# Patient Record
Sex: FEMALE | Race: WHITE | HISPANIC OR LATINO | Employment: OTHER | URBAN - METROPOLITAN AREA
[De-identification: names, ages, dates, MRNs, and addresses within clinical notes are randomized per-mention and may not be internally consistent; named-entity substitution may affect disease eponyms.]

---

## 2017-02-16 RX ORDER — SERTRALINE HYDROCHLORIDE 50 MG/1
50 TABLET, FILM COATED ORAL DAILY
Qty: 90 TABLET | Refills: 0 | Status: SHIPPED | OUTPATIENT
Start: 2017-02-16 | End: 2017-06-05 | Stop reason: SDUPTHER

## 2017-06-05 RX ORDER — SERTRALINE HYDROCHLORIDE 50 MG/1
50 TABLET, FILM COATED ORAL DAILY
Qty: 90 TABLET | Refills: 1 | Status: SHIPPED | OUTPATIENT
Start: 2017-06-05 | End: 2017-11-30 | Stop reason: SDUPTHER

## 2017-06-14 DIAGNOSIS — I10 ESSENTIAL HYPERTENSION: ICD-10-CM

## 2017-06-14 RX ORDER — LISINOPRIL 10 MG/1
10 TABLET ORAL DAILY
Qty: 30 TABLET | Refills: 4 | Status: SHIPPED | OUTPATIENT
Start: 2017-06-14 | End: 2017-08-16 | Stop reason: SDUPTHER

## 2017-08-16 DIAGNOSIS — I10 ESSENTIAL HYPERTENSION: ICD-10-CM

## 2017-08-16 RX ORDER — LISINOPRIL 10 MG/1
10 TABLET ORAL DAILY
Qty: 90 TABLET | Refills: 0 | Status: SHIPPED | OUTPATIENT
Start: 2017-08-16 | End: 2017-12-05 | Stop reason: SDUPTHER

## 2017-10-03 ENCOUNTER — IMMUNIZATION (OUTPATIENT)
Dept: INTERNAL MEDICINE | Facility: CLINIC | Age: 77
End: 2017-10-03
Payer: MEDICARE

## 2017-10-03 ENCOUNTER — OFFICE VISIT (OUTPATIENT)
Dept: INTERNAL MEDICINE | Facility: CLINIC | Age: 77
End: 2017-10-03
Payer: MEDICARE

## 2017-10-03 ENCOUNTER — HOSPITAL ENCOUNTER (OUTPATIENT)
Dept: RADIOLOGY | Facility: HOSPITAL | Age: 77
Discharge: HOME | End: 2017-10-03
Attending: NURSE PRACTITIONER
Payer: MEDICARE

## 2017-10-03 ENCOUNTER — TELEPHONE (OUTPATIENT)
Dept: INTERNAL MEDICINE | Facility: CLINIC | Age: 77
End: 2017-10-03

## 2017-10-03 VITALS
BODY MASS INDEX: 30.17 KG/M2 | HEART RATE: 97 BPM | SYSTOLIC BLOOD PRESSURE: 118 MMHG | OXYGEN SATURATION: 96 % | HEIGHT: 61 IN | WEIGHT: 159.81 LBS | DIASTOLIC BLOOD PRESSURE: 60 MMHG | TEMPERATURE: 98 F

## 2017-10-03 DIAGNOSIS — I10 ESSENTIAL HYPERTENSION: ICD-10-CM

## 2017-10-03 DIAGNOSIS — Z90.710 STATUS POST VAGINAL HYSTERECTOMY: ICD-10-CM

## 2017-10-03 DIAGNOSIS — Z00.00 ENCOUNTER FOR PREVENTIVE HEALTH EXAMINATION: Primary | ICD-10-CM

## 2017-10-03 DIAGNOSIS — K21.9 GASTROESOPHAGEAL REFLUX DISEASE WITHOUT ESOPHAGITIS: ICD-10-CM

## 2017-10-03 DIAGNOSIS — K44.9 HIATAL HERNIA: ICD-10-CM

## 2017-10-03 DIAGNOSIS — Z12.31 VISIT FOR SCREENING MAMMOGRAM: ICD-10-CM

## 2017-10-03 DIAGNOSIS — I77.1 TORTUOUS AORTA: ICD-10-CM

## 2017-10-03 DIAGNOSIS — M81.0 OSTEOPOROSIS, SENILE: ICD-10-CM

## 2017-10-03 PROCEDURE — G0439 PPPS, SUBSEQ VISIT: HCPCS | Mod: S$GLB,,, | Performed by: NURSE PRACTITIONER

## 2017-10-03 PROCEDURE — 77067 SCR MAMMO BI INCL CAD: CPT | Mod: TC

## 2017-10-03 PROCEDURE — 90662 IIV NO PRSV INCREASED AG IM: CPT | Mod: S$GLB,,, | Performed by: NURSE PRACTITIONER

## 2017-10-03 PROCEDURE — G0008 ADMIN INFLUENZA VIRUS VAC: HCPCS | Mod: S$GLB,,, | Performed by: NURSE PRACTITIONER

## 2017-10-03 PROCEDURE — 99999 PR PBB SHADOW E&M-EST. PATIENT-LVL V: CPT | Mod: PBBFAC,,, | Performed by: NURSE PRACTITIONER

## 2017-10-03 PROCEDURE — 77067 SCR MAMMO BI INCL CAD: CPT | Mod: 26,,, | Performed by: RADIOLOGY

## 2017-10-03 PROCEDURE — 99499 UNLISTED E&M SERVICE: CPT | Mod: S$GLB,,, | Performed by: NURSE PRACTITIONER

## 2017-10-03 RX ORDER — UBIDECARENONE 30 MG
30 CAPSULE ORAL DAILY
COMMUNITY
End: 2019-02-19

## 2017-10-03 NOTE — PATIENT INSTRUCTIONS
Counseling and Referral of Other Preventative  (Italic type indicates deductible and co-insurance are waived)    Patient Name: Milady Blanco  Today's Date: 10/3/2017      SERVICE LIMITATIONS RECOMMENDATION    Vaccines    · Pneumococcal (once after 65)    · Influenza (annually)    · Hepatitis B (if medium/high risk)    · Prevnar 13      Hepatitis B medium/high risk factors:       - End-stage renal disease       - Hemophiliacs who received Factor VII or         IX concentrates       - Clients of institutions for the mentally             retarded       - Persons who live in the same house as          a HepB carrier       - Homosexual men       - Illicit injectable drug abusers     Pneumococcal: Done, no repeat necessary     Influenza: Scheduled - see appointments     Hepatitis B: N/A     Prevnar 13: Done, no repeat necessary    Mammogram (biennial age 50-74)  Annually (age 40 or over)  Scheduled, see appointments    Pap (up to age 70 and after 70 if unknown history or abnormal study last 10 years)    N/A     The USPSTF recommends against screening for cervical cancer in women older than age 65 years who have had adequate prior screening and are not otherwise at high risk for cervical cancer.      Colorectal cancer screening (to age 75)    · Fecal occult blood test (annual)  · Flexible sigmoidoscopy (5y)  · Screening colonoscopy (10y)  · Barium enema   Last done 2016, recommend to repeat every 3  years    Diabetes self-management training (no USPSTF recommendations)  Requires referral by treating physician for patient with diabetes or renal disease. 10 hours of initial DSMT sessions of no less than 30 minutes each in a continuous 12-month period. 2 hours of follow-up DSMT in subsequent years.  N/A    Bone mass measurements (age 65 & older, biennial)  Requires diagnosis related to osteoporosis or estrogen deficiency. Biennial benefit unless patient has history of long-term glucocorticoid  Scheduled, see  appointments    Glaucoma screening (no USPSTF recommendation)  Diabetes mellitus, family history   , age 50 or over    American, age 65 or over  Recommend follow up with eye care professional regularly    Medical nutrition therapy for diabetes or renal disease (no recommended schedule)  Requires referral by treating physician for patient with diabetes or renal disease or kidney transplant within the past 3 years.  Can be provided in same year as diabetes self-management training (DSMT), and CMS recommends medical nutrition therapy take place after DSMT. Up to 3 hours for initial year and 2 hours in subsequent years.  N/A    Cardiovascular screening blood tests (every 5 years)  · Fasting lipid panel  Order as a panel if possible  Done this year, repeat every year    Diabetes screening tests (at least every 3 years, Medicare covers annually or at 6-month intervals for prediabetic patients)  · Fasting blood sugar (FBS) or glucose tolerance test (GTT)  Patient must be diagnosed with one of the following:       - Hypertension       - Dyslipidemia       - Obesity (BMI 30kg/m2)       - Previous elevated impaired FBS or GTT       ... or any two of the following:       - Overweight (BMI 25 but <30)       - Family history of diabetes       - Age 65 or older       - History of gestational diabetes or birth of baby weighing more than 9 pounds  Done this year, repeat every year    HIV screening (annually for increased risk patients)  · HIV-1 and HIV-2 by EIA, or RAMIRO, rapid antibody test or oral mucosa transudate  Patients must be at increased risk for HIV infection per USPSTF guidelines or pregnant. Tests covered annually for patient at increased risk or as requested by the patient. Pregnant patients may receive up to 3 tests during pregnancy.  Risks discussed, screening is not recommended    Smoking cessation counseling (up to 8 sessions per year)  Patients must be asymptomatic of tobacco-related  conditions to receive as a preventative service.  Non-smoker    Subsequent annual wellness visit  At least 12 months since last AWV  Return in one year     The following information is provided to all patients.  This information is to help you find resources for any of the problems found today that may be affecting your health:                Living healthy guide: www.[facility].[place].[facility]      Understanding Diabetes: www.diabetes.org      Eating healthy: www.cdc.gov/healthyweight      CDC home safety checklist: www.cdc.gov/steadi/patient.html      Agency on Aging: www.goea.[place].[facility]      Alcoholics anonymous (AA): www.aa.org      Physical Activity: www.trey.nih.gov/kp6mmhs      Tobacco use: www.quitwithusla.org

## 2017-10-03 NOTE — PROGRESS NOTES
"Milady Blanco presented for a  Medicare AWV and comprehensive Health Risk Assessment today. The following components were reviewed and updated:    · Medical history  · Family History  · Social history  · Allergies and Current Medications  · Health Risk Assessment  · Health Maintenance  · Care Team     ** See Completed Assessments for Annual Wellness Visit within the encounter summary.**       The following assessments were completed:  · Living Situation  · CAGE  · Depression Screening  · Timed Get Up and Go  · Whisper Test  · Cognitive Function Screening  · Nutrition Screening  · ADL Screening  · PAQ Screening    Vitals:    10/03/17 1505 10/03/17 1551   BP: 118/60    Pulse: (!) 111 97   Temp: 98.1 °F (36.7 °C)    TempSrc: Oral    SpO2: 96%    Weight: 72.5 kg (159 lb 13.3 oz)    Height: 5' 1" (1.549 m)      Body mass index is 30.2 kg/m².  Physical Exam   Constitutional: She is oriented to person, place, and time. She appears well-developed.   overweight   HENT:   Head: Normocephalic and atraumatic.   Nose: Nose normal.   Eyes: Conjunctivae and EOM are normal.   Neck: Normal range of motion. Neck supple.   Cardiovascular: Normal rate, regular rhythm, normal heart sounds and intact distal pulses.    No murmur heard.  Pulmonary/Chest: Effort normal and breath sounds normal.   Musculoskeletal: Normal range of motion.   Neurological: She is alert and oriented to person, place, and time.   Skin: Skin is warm and dry.   Psychiatric: She has a normal mood and affect. Her behavior is normal. Judgment and thought content normal.   Nursing note and vitals reviewed.        Diagnoses and health risks identified today and associated recommendations/orders:    1. Encounter for preventive health examination  Assessment performed. Health maintenance updated. Chart review completed.  -Declined .  is present for translation.    2. Visit for screening mammogram  - Mammo Digital Screening Bilateral With CAD; " Future    3. Osteoporosis, senile  - DXA Bone Density Spine And Hip; Future    4. Essential hypertension  Chronic. Stable on current regimen. Followed by PCP.    5. Gastroesophageal reflux disease without esophagitis  Chronic. Stable on current regimen. Followed by Gastroenterology.    6. Hiatal hernia  Stable. Followed by PCP.    7. Status post vaginal hysterectomy  Stable. Followed by OB and Gyn.    8. Tortuous aorta  Noted on imaging 5/27/2014. Stable with blood pressure control. Followed by PCp.    9. BMI 30.0-30.9,adult  Stable. Followed by PCP.      Provided Milady with a 5-10 year written screening schedule and personal prevention plan. Recommendations were developed using the USPSTF age appropriate recommendations. Education, counseling, and referrals were provided as needed. After Visit Summary printed and given to patient which includes a list of additional screenings\tests needed.    Return for follow up with Primary Care Provider as instructed, ;sooner if problems, HRA in 1 year.    URIAH Celaya

## 2017-10-10 ENCOUNTER — HOSPITAL ENCOUNTER (OUTPATIENT)
Dept: RADIOLOGY | Facility: CLINIC | Age: 77
Discharge: HOME | End: 2017-10-10
Attending: NURSE PRACTITIONER
Payer: MEDICARE

## 2017-10-10 DIAGNOSIS — M81.0 OSTEOPOROSIS, SENILE: ICD-10-CM

## 2017-10-10 PROCEDURE — 77080 DXA BONE DENSITY AXIAL: CPT | Mod: TC

## 2017-10-10 PROCEDURE — 77080 DXA BONE DENSITY AXIAL: CPT | Mod: 26,,, | Performed by: INTERNAL MEDICINE

## 2017-10-27 ENCOUNTER — TELEPHONE (OUTPATIENT)
Dept: INTERNAL MEDICINE | Facility: CLINIC | Age: 77
End: 2017-10-27

## 2017-11-30 RX ORDER — SERTRALINE HYDROCHLORIDE 50 MG/1
50 TABLET, FILM COATED ORAL DAILY
Qty: 90 TABLET | Refills: 1 | Status: SHIPPED | OUTPATIENT
Start: 2017-11-30 | End: 2018-05-27 | Stop reason: SDUPTHER

## 2017-12-05 DIAGNOSIS — I10 ESSENTIAL HYPERTENSION: ICD-10-CM

## 2017-12-05 RX ORDER — LISINOPRIL 10 MG/1
10 TABLET ORAL DAILY
Qty: 90 TABLET | Refills: 0 | Status: SHIPPED | OUTPATIENT
Start: 2017-12-05 | End: 2018-03-03 | Stop reason: SDUPTHER

## 2018-03-03 DIAGNOSIS — I10 ESSENTIAL HYPERTENSION: ICD-10-CM

## 2018-03-03 RX ORDER — LISINOPRIL 10 MG/1
10 TABLET ORAL DAILY
Qty: 90 TABLET | Refills: 0 | Status: SHIPPED | OUTPATIENT
Start: 2018-03-03 | End: 2018-05-30 | Stop reason: SDUPTHER

## 2018-04-04 ENCOUNTER — HOSPITAL ENCOUNTER (OUTPATIENT)
Dept: RADIOLOGY | Facility: HOSPITAL | Age: 78
Discharge: HOME | End: 2018-04-04
Attending: NURSE PRACTITIONER
Payer: MEDICARE

## 2018-04-04 ENCOUNTER — OFFICE VISIT (OUTPATIENT)
Dept: INTERNAL MEDICINE | Facility: CLINIC | Age: 78
End: 2018-04-04
Payer: MEDICARE

## 2018-04-04 VITALS
HEIGHT: 61 IN | DIASTOLIC BLOOD PRESSURE: 70 MMHG | HEART RATE: 94 BPM | WEIGHT: 162.69 LBS | BODY MASS INDEX: 30.71 KG/M2 | OXYGEN SATURATION: 97 % | SYSTOLIC BLOOD PRESSURE: 122 MMHG

## 2018-04-04 DIAGNOSIS — S60.222A CONTUSION OF LEFT HAND, INITIAL ENCOUNTER: Primary | ICD-10-CM

## 2018-04-04 DIAGNOSIS — S60.222A CONTUSION OF LEFT HAND, INITIAL ENCOUNTER: ICD-10-CM

## 2018-04-04 PROCEDURE — 99999 PR PBB SHADOW E&M-EST. PATIENT-LVL IV: CPT | Mod: PBBFAC,,, | Performed by: NURSE PRACTITIONER

## 2018-04-04 PROCEDURE — 3078F DIAST BP <80 MM HG: CPT | Mod: CPTII,S$GLB,, | Performed by: NURSE PRACTITIONER

## 2018-04-04 PROCEDURE — 3074F SYST BP LT 130 MM HG: CPT | Mod: CPTII,S$GLB,, | Performed by: NURSE PRACTITIONER

## 2018-04-04 PROCEDURE — 99214 OFFICE O/P EST MOD 30 MIN: CPT | Mod: S$GLB,,, | Performed by: NURSE PRACTITIONER

## 2018-04-04 PROCEDURE — 73130 X-RAY EXAM OF HAND: CPT | Mod: TC,LT

## 2018-04-04 PROCEDURE — 73130 X-RAY EXAM OF HAND: CPT | Mod: 26,LT,, | Performed by: RADIOLOGY

## 2018-04-04 NOTE — PROGRESS NOTES
Subjective:       Patient ID: Milady Blanco is a 77 y.o. female.    Chief Complaint: Rib Injury and Breast Pain    Pt here states that she tripped at home and fell forward landed on chest and hands.  2d after incident had chest pain and some SOB.  Those sx have resolved.  Only complaint today is left hand pain.  Pt is RHD.  Pt denies any head pain, neck pain, SOB, n/v/d/wrist pain.  Pt requesting interpretor  Interpretor not arranged by  at time appt was made ( 3/28), delay in seeing pt due to securing an interpretor          Past Medical History:   Diagnosis Date    Asymptomatic cholelithiasis     Colon polyp     Depression     Eosinophilic granuloma of lung     Gastritis     GERD (gastroesophageal reflux disease)     hiatal hernia     History of epistaxis     Hypertension     Osteoporosis, senile     Pelvic relaxation     with pessary      Past Surgical History:   Procedure Laterality Date    CARPAL TUNNEL RELEASE      Left    COLONOSCOPY N/A 10/20/2016    Procedure: COLONOSCOPY;  Surgeon: Arnel Bernard MD;  Location: [facility] ([address] [place] [address]);  Service: Endoscopy;  Laterality: N/A;    Excision of neurofibroma      EYE SURGERY      Eyelid surgery      HYSTERECTOMY       Social History     Social History Narrative    No narrative on file     Family History   Problem Relation Age of Onset    Coronary artery disease Mother     Heart disease Mother     No Known Problems Father     Coronary artery disease Maternal Grandmother     Heart disease Maternal Grandmother     Hypertension Paternal Uncle     Breast cancer Maternal Aunt     Cancer Maternal Aunt      breast cancer     Pancreatic cancer Cousin     Cancer Cousin      pancreatic cancer    Breast cancer Cousin     Heart disease Maternal Uncle     Breast cancer Cousin     No Known Problems Sister     No Known Problems Brother     No Known Problems Paternal Aunt     No Known Problems Maternal Grandfather     No Known  "Problems Paternal Grandmother     No Known Problems Paternal Grandfather     No Known Problems Brother     Multiple sclerosis Daughter     Colon cancer Neg Hx     Ovarian cancer Neg Hx     Amblyopia Neg Hx     Blindness Neg Hx     Cataracts Neg Hx     Diabetes Neg Hx     Glaucoma Neg Hx     Macular degeneration Neg Hx     Retinal detachment Neg Hx     Strabismus Neg Hx     Stroke Neg Hx     Thyroid disease Neg Hx      Vitals:    04/04/18 0857   BP: 122/70   Pulse: 94   SpO2: 97%   Weight: 73.8 kg (162 lb 11.2 oz)   Height: 5' 1" (1.549 m)     Outpatient Encounter Prescriptions as of 4/4/2018   Medication Sig Dispense Refill    calcium carbonate (OS-DAVID) 600 mg (1,500 mg) Tab Take 600 mg by mouth 2 (two) times daily with meals.      cholecalciferol, vitamin D3, 1,000 unit capsule Take 1 capsule (1,000 Units total) by mouth once daily.  0    co-enzyme Q-10 30 mg capsule Take 30 mg by mouth once daily.      lisinopril 10 MG tablet TAKE 1 TABLET (10 MG TOTAL) BY MOUTH ONCE DAILY. 90 tablet 0    milk thistle 175 mg tablet Take 175 mg by mouth once daily.      omeprazole (PRILOSEC) 40 MG capsule Take 1 capsule (40 mg total) by mouth once daily. 30 capsule 11    sertraline (ZOLOFT) 50 MG tablet TAKE 1 TABLET (50 MG TOTAL) BY MOUTH ONCE DAILY. 90 tablet 1    vitamin E 100 UNIT capsule Take 100 Units by mouth once daily.       No facility-administered encounter medications on file as of 4/4/2018.      Wt Readings from Last 3 Encounters:   04/04/18 73.8 kg (162 lb 11.2 oz)   10/03/17 72.5 kg (159 lb 13.3 oz)   10/20/16 74.8 kg (165 lb)     Last 3 sets of Vitals    Vitals - 1 value per visit 10/20/2016 10/3/2017 4/4/2018   SYSTOLIC 107 118 122   DIASTOLIC 60 60 70   PULSE 69 97 94   TEMPERATURE 97.4 98.1 -   RESPIRATIONS 18 - -   SPO2 93 96 97   Weight (lb) 165 159.83 162.7   Weight (kg) 74.844 72.5 73.8   HEIGHT 5' 1" 5' 1" 5' 1"   BODY MASS INDEX 31.18 30.2 30.74   VISIT REPORT - - -   Pain Score  - 0 " -     No results found for: CBC  Review of Systems   Constitutional: Negative for activity change, appetite change and fatigue.   HENT: Negative for congestion, tinnitus and trouble swallowing.    Respiratory: Negative for cough and shortness of breath.    Cardiovascular: Negative for chest pain and palpitations.   Gastrointestinal: Negative for abdominal pain, diarrhea, nausea and vomiting.   Genitourinary: Negative for difficulty urinating.   Musculoskeletal: Positive for arthralgias. Negative for joint swelling, myalgias, neck pain and neck stiffness.   Skin: Negative for wound.   Neurological: Negative for dizziness, light-headedness and headaches.   Psychiatric/Behavioral: Negative for sleep disturbance.       Objective:      Physical Exam   Constitutional: She is oriented to person, place, and time. She appears well-developed and well-nourished. No distress.   HENT:   Head: Normocephalic and atraumatic.   Right Ear: External ear normal.   Left Ear: External ear normal.   Nose: Nose normal.   Mouth/Throat: Oropharynx is clear and moist. No oropharyngeal exudate.   Eyes: Conjunctivae are normal. Pupils are equal, round, and reactive to light. Right eye exhibits no discharge. Left eye exhibits no discharge. No scleral icterus.   Neck: Normal range of motion. Neck supple.   Cardiovascular: Normal rate, regular rhythm, normal heart sounds and intact distal pulses.  Exam reveals no friction rub.    No murmur heard.  Pulmonary/Chest: Effort normal and breath sounds normal. No stridor. No respiratory distress. She has no wheezes. She has no rales. She exhibits no tenderness.   Abdominal: Soft. She exhibits no distension. There is no tenderness.   Musculoskeletal:        Left hand: She exhibits tenderness, bony tenderness and swelling. She exhibits normal range of motion, normal capillary refill, no deformity and no laceration. Normal sensation noted. Normal strength noted.        Hands:  Lymphadenopathy:     She has  no cervical adenopathy.   Neurological: She is alert and oriented to person, place, and time. No cranial nerve deficit.   Skin: Skin is warm and dry. Capillary refill takes less than 2 seconds. No rash noted. She is not diaphoretic. No erythema. No pallor.   Psychiatric: She has a normal mood and affect. Her behavior is normal. Judgment and thought content normal.   Nursing note and vitals reviewed.      Assessment:       1. Contusion of left hand, initial encounter        Plan:       Vy Clark interpretor   Will call pt with xray results  Milady was seen today for rib injury and breast pain.    Diagnoses and all orders for this visit:    Contusion of left hand, initial encounter  -     X-Ray Hand 3 view Left; Future      Patient Instructions       Tratamiento de un hematoma óseo (contusión ósea)  Un hematoma óseo es reymundo lesión en un hueso menos grave que reymundo fractura. Los hematomas óseos son bastante comunes. Pueden sucederles a personas de cualquier edad. Cualquier tipo de hueso del cuerpo puede tener un hematoma óseo. Junto con un hematoma óseo, suelen ocurrir otras lesiones; por ejemplo, daño en los ligamentos cercanos.  Tipos de tratamiento  El tratamiento de un hematoma óseo puede incluir lo siguiente:  · No utilizar el hueso o la articulación afectados  · Aplicarse reymundo compresa de hielo sobre la michael varias veces al día  · Subir la michael lesionada por encima del nivel de milian corazón para reducir la inflamación  · Binh medicamentos para reducir el dolor y la hinchazón  · Usar algún tipo de dispositivo que proteja la michael y limite el movimiento, en gary necesario  Milian médico puede darle recomendaciones sobre milian dieta. [place] es porque comer reymundo dieta lucas en calcio, vitamina D y proteínas puede ayudar en milian recuperación. Milian médico puede pedirle que no use ciertos medicamentos de venta clotilde para aliviar el dolor. Algunos de estos medicamentos retrasan la recuperación normal de los huesos. Si fuma, es posible  que camejo médico le sugiera dejar de hacerlo. Fumar puede retrasar la recuperación de los huesos.  Camejo proveedor de atención médica le dirá por cuánto tiempo no podrá poner peso sobre el hueso afectado. La mayoría de los hematomas óseos sanan lentamente en unos dos a cuatro meses. Un hematoma óseo más jose c puede tardar más en sanar. Quizás no pueda volver a trino actividades deportivas por algunas semanas o meses. Si trino síntomas no desaparecen, camejo proveedor de atención médica puede indicarle que se sabas reymundo resonancia magnética.  Posibles complicaciones de un hematoma óseo  La mayoría de los hematomas óseos sanan sin problemas. Si camejo hematoma óseo es muy jose c, puede que camejo cuerpo tenga dificultades para que la deo vuelva a fluir normalmente en la michael. Eso puede ocasionar reymundo necrosis avascular del hueso, lo que provoca la muerte de helen parte del hueso.     Cuándo llamar a camejo proveedor de atención médica  Llame a camejo proveedor de atención médica si trino síntomas no mejoran en los siguientes jacklyn. Llámelo de inmediato si tiene síntomas graves, philipp fiebre monika.                  Date Last Reviewed: 7/21/2015  © 2775-0087 [employer]. [address] [place] [address], [place] [address]. All rights reserved. This information is not intended as a substitute for professional medical care. Always follow your healthcare professional's instructions.

## 2018-04-04 NOTE — PATIENT INSTRUCTIONS
Tratamiento de un hematoma óseo (contusión ósea)  Un hematoma óseo es reymundo lesión en un hueso menos grave que reymundo fractura. Los hematomas óseos son bastante comunes. Pueden sucederles a personas de cualquier edad. Cualquier tipo de hueso del cuerpo puede tener un hematoma óseo. Junto con un hematoma óseo, suelen ocurrir otras lesiones; por ejemplo, daño en los ligamentos cercanos.  Tipos de tratamiento  El tratamiento de un hematoma óseo puede incluir lo siguiente:  · No utilizar el hueso o la articulación afectados  · Aplicarse reymundo compresa de hielo sobre la michael varias veces al día  · Subir la michael lesionada por encima del nivel de camejo corazón para reducir la inflamación  · Binh medicamentos para reducir el dolor y la hinchazón  · Usar algún tipo de dispositivo que proteja la michael y limite el movimiento, en gary necesario  Camejo médico puede darle recomendaciones sobre camejo dieta. [place] es porque comer reymundo dieta lucas en calcio, vitamina D y proteínas puede ayudar en camejo recuperación. Camejo médico puede pedirle que no use ciertos medicamentos de venta clotilde para aliviar el dolor. Algunos de estos medicamentos retrasan la recuperación normal de los huesos. Si fuma, es posible que camejo médico le sugiera dejar de hacerlo. Fumar puede retrasar la recuperación de los huesos.  Camejo proveedor de atención médica le dirá por cuánto tiempo no podrá poner peso sobre el hueso afectado. La mayoría de los hematomas óseos sanan lentamente en unos dos a cuatro meses. Un hematoma óseo más jose c puede tardar más en sanar. Quizás no pueda volver a trino actividades deportivas por algunas semanas o meses. Si trino síntomas no desaparecen, camejo proveedor de atención médica puede indicarle que se sabas reymundo resonancia magnética.  Posibles complicaciones de un hematoma óseo  La mayoría de los hematomas óseos sanan sin problemas. Si camejo hematoma óseo es muy jose c, puede que camejo cuerpo tenga dificultades para que la deo vuelva a fluir normalmente en la  michael. Eso puede ocasionar reymundo necrosis avascular del hueso, lo que provoca la muerte de helen parte del hueso.     Cuándo llamar a camejo proveedor de atención médica  Llame a camejo proveedor de atención médica si trino síntomas no mejoran en los siguientes jacklyn. Llámelo de inmediato si tiene síntomas graves, philipp fiebre monika.                  Date Last Reviewed: 7/21/2015  © 2683-8855 [employer]. [address] [place] [address], [place] [address]. All rights reserved. This information is not intended as a substitute for professional medical care. Always follow your healthcare professional's instructions.

## 2018-05-27 RX ORDER — SERTRALINE HYDROCHLORIDE 50 MG/1
50 TABLET, FILM COATED ORAL DAILY
Qty: 90 TABLET | Refills: 1 | Status: SHIPPED | OUTPATIENT
Start: 2018-05-27 | End: 2018-11-22 | Stop reason: SDUPTHER

## 2018-05-30 ENCOUNTER — PES CALL (OUTPATIENT)
Dept: ADMINISTRATIVE | Facility: CLINIC | Age: 78
End: 2018-05-30

## 2018-05-30 DIAGNOSIS — I10 ESSENTIAL HYPERTENSION: ICD-10-CM

## 2018-05-30 RX ORDER — LISINOPRIL 10 MG/1
10 TABLET ORAL DAILY
Qty: 90 TABLET | Refills: 0 | Status: SHIPPED | OUTPATIENT
Start: 2018-05-30 | End: 2018-09-27 | Stop reason: SDUPTHER

## 2018-09-27 DIAGNOSIS — I10 ESSENTIAL HYPERTENSION: ICD-10-CM

## 2018-09-27 RX ORDER — LISINOPRIL 10 MG/1
TABLET ORAL
Qty: 90 TABLET | Refills: 0 | Status: SHIPPED | OUTPATIENT
Start: 2018-09-27 | End: 2018-12-24 | Stop reason: SDUPTHER

## 2018-10-17 ENCOUNTER — TELEPHONE (OUTPATIENT)
Dept: INTERNAL MEDICINE | Facility: CLINIC | Age: 78
End: 2018-10-17

## 2018-10-17 NOTE — TELEPHONE ENCOUNTER
----- Message from Breezy Matias sent at 10/17/2018 10:27 AM CDT -----  Contact: Patient   1/08/19 Annual Physical need lab orders placed and linked  Thank you

## 2018-10-18 DIAGNOSIS — Z00.00 ANNUAL PHYSICAL EXAM: ICD-10-CM

## 2018-10-18 DIAGNOSIS — I10 ESSENTIAL HYPERTENSION: Primary | ICD-10-CM

## 2018-11-22 RX ORDER — SERTRALINE HYDROCHLORIDE 50 MG/1
TABLET, FILM COATED ORAL
Qty: 90 TABLET | Refills: 1 | Status: SHIPPED | OUTPATIENT
Start: 2018-11-22 | End: 2019-02-19

## 2018-12-24 DIAGNOSIS — I10 ESSENTIAL HYPERTENSION: ICD-10-CM

## 2018-12-24 RX ORDER — LISINOPRIL 10 MG/1
TABLET ORAL
Qty: 90 TABLET | Refills: 0 | Status: SHIPPED | OUTPATIENT
Start: 2018-12-24 | End: 2019-02-19 | Stop reason: SDUPTHER

## 2019-02-05 ENCOUNTER — LAB VISIT (OUTPATIENT)
Dept: LAB | Facility: HOSPITAL | Age: 79
End: 2019-02-05
Attending: INTERNAL MEDICINE
Payer: MEDICARE

## 2019-02-05 DIAGNOSIS — I10 ESSENTIAL HYPERTENSION: ICD-10-CM

## 2019-02-05 DIAGNOSIS — Z00.00 ANNUAL PHYSICAL EXAM: ICD-10-CM

## 2019-02-05 LAB
ALBUMIN SERPL BCP-MCNC: 3.7 G/DL
ALP SERPL-CCNC: 67 U/L
ALT SERPL W/O P-5'-P-CCNC: 14 U/L
ANION GAP SERPL CALC-SCNC: 7 MMOL/L
AST SERPL-CCNC: 14 U/L
BASOPHILS # BLD AUTO: 0.05 K/UL
BASOPHILS NFR BLD: 1.2 %
BILIRUB DIRECT SERPL-MCNC: 0.2 MG/DL
BILIRUB SERPL-MCNC: 0.5 MG/DL
BUN SERPL-MCNC: 22 MG/DL
CALCIUM SERPL-MCNC: 9.7 MG/DL
CHLORIDE SERPL-SCNC: 106 MMOL/L
CHOLEST SERPL-MCNC: 217 MG/DL
CHOLEST/HDLC SERPL: 3.2 {RATIO}
CO2 SERPL-SCNC: 27 MMOL/L
CREAT SERPL-MCNC: 0.8 MG/DL
DIFFERENTIAL METHOD: ABNORMAL
EOSINOPHIL # BLD AUTO: 0.1 K/UL
EOSINOPHIL NFR BLD: 2 %
ERYTHROCYTE [DISTWIDTH] IN BLOOD BY AUTOMATED COUNT: 13.6 %
EST. GFR  (AFRICAN AMERICAN): >60 ML/MIN/1.73 M^2
EST. GFR  (NON AFRICAN AMERICAN): >60 ML/MIN/1.73 M^2
GLUCOSE SERPL-MCNC: 91 MG/DL
HCT VFR BLD AUTO: 39.9 %
HDLC SERPL-MCNC: 67 MG/DL
HDLC SERPL: 30.9 %
HGB BLD-MCNC: 12.6 G/DL
LDLC SERPL CALC-MCNC: 110.2 MG/DL
LYMPHOCYTES # BLD AUTO: 1.7 K/UL
LYMPHOCYTES NFR BLD: 42 %
MCH RBC QN AUTO: 28.6 PG
MCHC RBC AUTO-ENTMCNC: 31.6 G/DL
MCV RBC AUTO: 91 FL
MONOCYTES # BLD AUTO: 0.5 K/UL
MONOCYTES NFR BLD: 12 %
NEUTROPHILS # BLD AUTO: 1.7 K/UL
NEUTROPHILS NFR BLD: 42.6 %
NONHDLC SERPL-MCNC: 150 MG/DL
PLATELET # BLD AUTO: 165 K/UL
PMV BLD AUTO: 10.9 FL
POTASSIUM SERPL-SCNC: 4.4 MMOL/L
PROT SERPL-MCNC: 6.6 G/DL
RBC # BLD AUTO: 4.4 M/UL
SODIUM SERPL-SCNC: 140 MMOL/L
TRIGL SERPL-MCNC: 199 MG/DL
TSH SERPL DL<=0.005 MIU/L-ACNC: 2.27 UIU/ML
WBC # BLD AUTO: 4.07 K/UL

## 2019-02-05 PROCEDURE — 85025 COMPLETE CBC W/AUTO DIFF WBC: CPT | Mod: HCNC

## 2019-02-05 PROCEDURE — 36415 COLL VENOUS BLD VENIPUNCTURE: CPT | Mod: HCNC

## 2019-02-05 PROCEDURE — 80076 HEPATIC FUNCTION PANEL: CPT | Mod: HCNC

## 2019-02-05 PROCEDURE — 80048 BASIC METABOLIC PNL TOTAL CA: CPT | Mod: HCNC

## 2019-02-05 PROCEDURE — 80061 LIPID PANEL: CPT | Mod: HCNC

## 2019-02-05 PROCEDURE — 84443 ASSAY THYROID STIM HORMONE: CPT | Mod: HCNC

## 2019-02-19 ENCOUNTER — HOSPITAL ENCOUNTER (OUTPATIENT)
Dept: CARDIOLOGY | Facility: CLINIC | Age: 79
Discharge: HOME | End: 2019-02-19
Payer: MEDICARE

## 2019-02-19 ENCOUNTER — OFFICE VISIT (OUTPATIENT)
Dept: INTERNAL MEDICINE | Facility: CLINIC | Age: 79
End: 2019-02-19
Payer: MEDICARE

## 2019-02-19 ENCOUNTER — HOSPITAL ENCOUNTER (OUTPATIENT)
Dept: RADIOLOGY | Facility: HOSPITAL | Age: 79
Discharge: HOME | End: 2019-02-19
Attending: INTERNAL MEDICINE
Payer: MEDICARE

## 2019-02-19 VITALS
SYSTOLIC BLOOD PRESSURE: 118 MMHG | WEIGHT: 164.69 LBS | BODY MASS INDEX: 31.09 KG/M2 | HEIGHT: 61 IN | HEART RATE: 68 BPM | DIASTOLIC BLOOD PRESSURE: 78 MMHG

## 2019-02-19 DIAGNOSIS — I10 HYPERTENSION, UNSPECIFIED TYPE: ICD-10-CM

## 2019-02-19 DIAGNOSIS — K44.9 HIATAL HERNIA: ICD-10-CM

## 2019-02-19 DIAGNOSIS — F41.9 ANXIETY: ICD-10-CM

## 2019-02-19 DIAGNOSIS — Z00.00 ANNUAL PHYSICAL EXAM: Primary | ICD-10-CM

## 2019-02-19 DIAGNOSIS — I10 ESSENTIAL HYPERTENSION: ICD-10-CM

## 2019-02-19 DIAGNOSIS — Z12.31 ENCOUNTER FOR SCREENING MAMMOGRAM FOR BREAST CANCER: ICD-10-CM

## 2019-02-19 PROCEDURE — 99397 PER PM REEVAL EST PAT 65+ YR: CPT | Mod: HCNC,S$GLB,, | Performed by: INTERNAL MEDICINE

## 2019-02-19 PROCEDURE — 3074F PR MOST RECENT SYSTOLIC BLOOD PRESSURE < 130 MM HG: ICD-10-PCS | Mod: HCNC,CPTII,S$GLB, | Performed by: INTERNAL MEDICINE

## 2019-02-19 PROCEDURE — 77067 MAMMO DIGITAL SCREENING BILAT WITH TOMOSYNTHESIS_CAD: ICD-10-PCS | Mod: 26,HCNC,, | Performed by: RADIOLOGY

## 2019-02-19 PROCEDURE — 93000 EKG 12-LEAD: ICD-10-PCS | Mod: HCNC,S$GLB,, | Performed by: INTERNAL MEDICINE

## 2019-02-19 PROCEDURE — 77067 SCR MAMMO BI INCL CAD: CPT | Mod: 26,HCNC,, | Performed by: RADIOLOGY

## 2019-02-19 PROCEDURE — 3078F PR MOST RECENT DIASTOLIC BLOOD PRESSURE < 80 MM HG: ICD-10-PCS | Mod: HCNC,CPTII,S$GLB, | Performed by: INTERNAL MEDICINE

## 2019-02-19 PROCEDURE — 99999 PR PBB SHADOW E&M-EST. PATIENT-LVL III: ICD-10-PCS | Mod: PBBFAC,HCNC,, | Performed by: INTERNAL MEDICINE

## 2019-02-19 PROCEDURE — 3078F DIAST BP <80 MM HG: CPT | Mod: HCNC,CPTII,S$GLB, | Performed by: INTERNAL MEDICINE

## 2019-02-19 PROCEDURE — 99999 PR PBB SHADOW E&M-EST. PATIENT-LVL III: CPT | Mod: PBBFAC,HCNC,, | Performed by: INTERNAL MEDICINE

## 2019-02-19 PROCEDURE — 93000 ELECTROCARDIOGRAM COMPLETE: CPT | Mod: HCNC,S$GLB,, | Performed by: INTERNAL MEDICINE

## 2019-02-19 PROCEDURE — 77063 BREAST TOMOSYNTHESIS BI: CPT | Mod: 26,HCNC,, | Performed by: RADIOLOGY

## 2019-02-19 PROCEDURE — 77067 SCR MAMMO BI INCL CAD: CPT | Mod: TC,HCNC

## 2019-02-19 PROCEDURE — 99397 PR PREVENTIVE VISIT,EST,65 & OVER: ICD-10-PCS | Mod: HCNC,S$GLB,, | Performed by: INTERNAL MEDICINE

## 2019-02-19 PROCEDURE — 77063 MAMMO DIGITAL SCREENING BILAT WITH TOMOSYNTHESIS_CAD: ICD-10-PCS | Mod: 26,HCNC,, | Performed by: RADIOLOGY

## 2019-02-19 PROCEDURE — 3074F SYST BP LT 130 MM HG: CPT | Mod: HCNC,CPTII,S$GLB, | Performed by: INTERNAL MEDICINE

## 2019-02-19 RX ORDER — LISINOPRIL 10 MG/1
10 TABLET ORAL DAILY
Qty: 90 TABLET | Refills: 3 | Status: SHIPPED | OUTPATIENT
Start: 2019-02-19 | End: 2020-03-03 | Stop reason: SDUPTHER

## 2019-02-20 NOTE — PROGRESS NOTES
"Subjective:       Patient ID: Milady Blanco is a 78 y.o. female.    Chief Complaint: Annual Exam  78 year old presents for physical . She has not been in for some time has continued to take her bp medicaiton  And stopped her zoloft. She did not feel it helped too much and has been fine without it although she considers herself an anxious person overall  She would like to remain off mediation. She also stopped her ppi has been using dietary measures for her reflux and hiatal hernia  Without recent symptoms.. She had her flu shot at [facility]. She has felt well and has been exercising regularly walking but still feels difficulty controlling her weight  She denies cp or sob. She does get rare palpiatoin she relates to her anxiety. Interpretor present .     HPI  Review of Systems   Constitutional: Negative for fever.   Respiratory: Negative for cough, shortness of breath and wheezing.    Cardiovascular: Negative for chest pain and palpitations.        Rare palpiation    Gastrointestinal: Negative for abdominal pain and constipation.   Neurological: Negative for dizziness.   Psychiatric/Behavioral:        Anxious often  Stopped zoloft prefers off for now        Objective:     Blood pressure 118/78, pulse 68, height 5' 1" (1.549 m), weight 74.7 kg (164 lb 10.9 oz).    Physical Exam   Constitutional: No distress.   HENT:   Head: Normocephalic.   Mouth/Throat: Oropharynx is clear and moist.   Eyes: No scleral icterus.   Neck: Neck supple.   Cardiovascular: Normal rate, regular rhythm and normal heart sounds. Exam reveals no gallop and no friction rub.   No murmur heard.  Pulmonary/Chest: Effort normal and breath sounds normal. No respiratory distress.   Breast : normal no masses or tenderness    Abdominal: Soft. Bowel sounds are normal. She exhibits no mass. There is no tenderness.   Musculoskeletal: She exhibits no edema.   Neurological: She is alert.   Skin: No erythema.   Psychiatric: She has a normal " mood and affect.   Vitals reviewed.      Assessment:       1. Annual physical exam    2. Hypertension, unspecified type    3. Encounter for screening mammogram for breast cancer    4. Hiatal hernia    5. Anxiety    6. Essential hypertension        Plan:       Milady was seen today for annual exam.    Diagnoses and all orders for this visit:    Annual physical exam    Hypertension, unspecified type  occaional palpiation  -     EKG 12-lead; Future  Discussed holter/cardiology if concerns persist     Encounter for screening mammogram for breast cancer  -     Mammo Digital Screening Bilat w/ Saeed; Future    Hiatal hernia  Pt has been asymptomatic with dietary meaures recently     Anxiety  She has stopped zoloft would like to remain off will call if feels need to resume     Essential hypertension  discusse risk benefits of medications and new study consider discussion of alternat  She would like to remain on her lisinopril for now low dose has been maintianing her blood sugar  -     lisinopril 10 MG tablet; Take 1 tablet (10 mg total) by mouth once daily.    She is up to date on her colonscopy     continues calcim /vitamin d and exercise     Continue healthy diet and regular exercise     Follow up annually   She had her flu shot

## 2019-02-21 ENCOUNTER — TELEPHONE (OUTPATIENT)
Dept: INTERNAL MEDICINE | Facility: CLINIC | Age: 79
End: 2019-02-21

## 2019-02-21 NOTE — TELEPHONE ENCOUNTER
----- Message from Lili Yi MD sent at 2/20/2019 10:40 AM CST -----  Please call or have Polish/interpretor call and notify pt her ekg and mammogram was normal acceptable no new concern

## 2020-02-29 ENCOUNTER — HOSPITAL ENCOUNTER (EMERGENCY)
Facility: HOSPITAL | Age: 80
Discharge: HOME | End: 2020-02-29
Attending: EMERGENCY MEDICINE
Payer: MEDICARE

## 2020-02-29 VITALS
BODY MASS INDEX: 30.58 KG/M2 | HEART RATE: 88 BPM | OXYGEN SATURATION: 93 % | DIASTOLIC BLOOD PRESSURE: 67 MMHG | HEIGHT: 61 IN | SYSTOLIC BLOOD PRESSURE: 118 MMHG | WEIGHT: 162 LBS | TEMPERATURE: 99 F | RESPIRATION RATE: 18 BRPM

## 2020-02-29 DIAGNOSIS — S40.011A CONTUSION OF RIGHT SHOULDER, INITIAL ENCOUNTER: Primary | ICD-10-CM
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
## 2020-02-29 PROCEDURE — 99284 PR EMERGENCY DEPT VISIT,LEVEL IV: ICD-10-PCS | Mod: ,,, | Performed by: PHYSICIAN ASSISTANT

## 2020-02-29 PROCEDURE — 25000003 PHARM REV CODE 250: Mod: HCNC | Performed by: PHYSICIAN ASSISTANT

## 2020-02-29 PROCEDURE — 99284 EMERGENCY DEPT VISIT MOD MDM: CPT | Mod: ,,, | Performed by: PHYSICIAN ASSISTANT

## 2020-02-29 PROCEDURE — 99283 EMERGENCY DEPT VISIT LOW MDM: CPT | Mod: 25,HCNC

## 2020-02-29 RX ORDER — ACETAMINOPHEN 325 MG/1
650 TABLET ORAL
Status: COMPLETED | OUTPATIENT
Start: 2020-02-29 | End: 2020-02-29

## 2020-02-29 RX ADMIN — ACETAMINOPHEN 650 MG: 325 TABLET ORAL at 10:02

## 2020-02-29 NOTE — ED TRIAGE NOTES
Milady Blanco, a 79 y.o. female presents to the ED w/ complaint of shoulder, knee, back, and hip pain r/t fall on Thursday this week, 6/10. Hx of osteoarthritis per patient. Denies SOB, chest pain, n/v/d, fever/chills, dizziness. Stable gait. Moves all extremities independently but limited ROM in right shoulder. States pain has gotten worse since initial fall. Denies numbness and tingling. Denies hitting head. Ice and tylenol at home for pain. Bruising to right hip and swelling to right knee.     Triage note:  Chief Complaint   Patient presents with    Fall     shoulder r , r knee, lower back, no loc, fell on thurs     Review of patient's allergies indicates:  No Known Allergies  Past Medical History:   Diagnosis Date    Asymptomatic cholelithiasis     Colon polyp     Depression     Eosinophilic granuloma of lung     Gastritis     GERD (gastroesophageal reflux disease)     hiatal hernia     History of epistaxis     Hypertension     Osteoporosis, senile     Pelvic relaxation     with pessary      Patient identifiers verified and correct for Milady Blanco.    LOC: The patient is awake, alert and aware of environment with an appropriate affect, the patient is oriented x 3 and speaking appropriately.  APPEARANCE: Patient resting comfortably and in no acute distress, patient is clean and well groomed, patient's clothing is properly fastened.  SKIN: The skin is warm and dry, color consistent with ethnicity, skin intact, bruising and swelling noted to right knee.  MUSCULOSKELETAL: Patient moving all extremities spontaneously but limited ROM to right shoulder, swelling and redness to right knee.  RESPIRATORY: Airway is open and patent, respirations are spontaneous, patient has a normal effort and rate, no accessory muscle use noted.  CARDIAC: No periphreal edema noted, capillary refill < 3 seconds.  NEUROLOGIC:Eyes open spontaneously, behavior appropriate to situation, follows commands, facial  expression symmetrical, bilateral hand grasp equal and even, purposeful motor response noted, normal sensation in all extremities when touched with a finger.

## 2020-02-29 NOTE — ED PROVIDER NOTES
Encounter Date: 2/29/2020       History     Chief Complaint   Patient presents with    Fall     shoulder r , r knee, lower back, no loc, fell on thurs     9:57 AM  Patient is a 79-year-old female with a history of HTN, osteoporosis, depression who presents the ED with polyarthralgias status post fall on Thursday, 2 days ago.  States that she tripped over her own feet and denies any prodromal symptoms such as headache, dizziness, chest pain, or shortness of breath.  His that she landed on the right side of her body.  Denies any head trauma or LOC.  Is complaining of 6/10 pain to her right shoulder, right hip, right knee, and middle low back.  Reports bruising to several areas of her body.  Patient has been ambulatory since but with pain. She states most of her pain is in her right shoulder with decreased range of motion and strength.  She denies any lower extremity paresthesias, lower extremity weakness, saddle anesthesias, bowel/bladder incontinence.  Last had Tylenol yesterday at 18:00 with improvement in her symptoms.  Has not been applying ice.    On chart review, Tdap updated on 12/15/2016.        Review of patient's allergies indicates:  No Known Allergies  Past Medical History:   Diagnosis Date    Asymptomatic cholelithiasis     Colon polyp     Depression     Eosinophilic granuloma of lung     Gastritis     GERD (gastroesophageal reflux disease)     hiatal hernia     History of epistaxis     Hypertension     Osteoporosis, senile     Pelvic relaxation     with pessary      Past Surgical History:   Procedure Laterality Date    CARPAL TUNNEL RELEASE      Left    COLONOSCOPY N/A 10/20/2016    Procedure: COLONOSCOPY;  Surgeon: Arnel Bernard MD;  Location: [address];  Service: Endoscopy;  Laterality: N/A;    Excision of neurofibroma      EYE SURGERY      Eyelid surgery      HYSTERECTOMY       Family History   Problem Relation Age of Onset    Coronary artery disease Mother     Heart  disease Mother     No Known Problems Father     Coronary artery disease Maternal Grandmother     Heart disease Maternal Grandmother     Hypertension Paternal Uncle     Breast cancer Maternal Aunt     Cancer Maternal Aunt         breast cancer     Pancreatic cancer Cousin     Cancer Cousin         pancreatic cancer    Breast cancer Cousin     Heart disease Maternal Uncle     Breast cancer Cousin     No Known Problems Sister     No Known Problems Brother     No Known Problems Paternal Aunt     No Known Problems Maternal Grandfather     No Known Problems Paternal Grandmother     No Known Problems Paternal Grandfather     No Known Problems Brother     Multiple sclerosis Daughter     Colon cancer Neg Hx     Ovarian cancer Neg Hx     Amblyopia Neg Hx     Blindness Neg Hx     Cataracts Neg Hx     Diabetes Neg Hx     Glaucoma Neg Hx     Macular degeneration Neg Hx     Retinal detachment Neg Hx     Strabismus Neg Hx     Stroke Neg Hx     Thyroid disease Neg Hx      Social History     Tobacco Use    Smoking status: Never Smoker    Smokeless tobacco: Never Used   Substance Use Topics    Alcohol use: No    Drug use: No     Review of Systems   Constitutional: Negative for chills and fever.   HENT: Negative for sore throat.    Respiratory: Negative for shortness of breath.    Cardiovascular: Negative for chest pain.   Gastrointestinal: Negative for nausea.   Genitourinary: Negative for dysuria.   Musculoskeletal: Positive for arthralgias and back pain.   Skin: Positive for color change. Negative for rash.   Neurological: Negative for weakness.   Hematological: Does not bruise/bleed easily.       Physical Exam     Initial Vitals [02/29/20 0937]   BP Pulse Resp Temp SpO2   (!) 140/73 106 18 98.9 °F (37.2 °C) 96 %      MAP       --         Physical Exam    Vitals reviewed.  Constitutional: She appears well-developed and well-nourished. She is not diaphoretic. No distress.   HENT:   Head:  Normocephalic and atraumatic.   Nose: Nose normal.   Eyes: Conjunctivae and EOM are normal.   Neck: Normal range of motion.   Cardiovascular: Normal rate.   Pulses:       Radial pulses are 2+ on the right side.   Pulmonary/Chest: No respiratory distress.   Abdominal: Soft. She exhibits no distension. There is no tenderness.   Musculoskeletal:        Right shoulder: She exhibits decreased range of motion, bony tenderness and decreased strength. She exhibits no deformity.        Right hip: Normal.        Right knee: She exhibits swelling and laceration (superficial abrasion). She exhibits normal range of motion, no effusion, normal alignment, normal patellar mobility and no bony tenderness.        Right ankle: Normal.        Cervical back: Normal.        Thoracic back: Normal.        Lumbar back: Normal.   No C, T, or L spinous process tenderness. No significant paraspinous muscle tenderness.  Limited range of motion of right shoulder.  Patient only able to flex, extend, and abduct no more than 30° with associated decreased strength secondary to pain. Right hip, knee, ankle, foot with intact range of motion and strength.  She has ecchymosis noted to her lateral right hip and right anterior shoulder.  Superficial abrasion noted to right knee.  No difficulty bearing weight or ambulating.   Neurological: She is alert and oriented to person, place, and time. No sensory deficit.   Skin: Skin is warm and dry. Abrasion and ecchymosis noted. No erythema. No pallor.   Psychiatric: She has a normal mood and affect. Thought content normal.         ED Course   Procedures  Labs Reviewed - No data to display       Imaging Results          X-Ray Shoulder Trauma Right (Final result)  Result time 02/29/20 10:41:08    Final result by Donaldo Wesley MD (02/29/20 10:41:08)                 Impression:      As above.      Electronically signed by: Donaldo Wesley  Date:    02/29/2020  Time:    10:41             Narrative:     EXAMINATION:  XR SHOULDER TRAUMA 3 VIEW RIGHT    CLINICAL HISTORY:  Unspecified fall, initial encounter    TECHNIQUE:  Three or four views of the right shoulder were performed.    COMPARISON:  None available    FINDINGS:  No acute fracture or dislocation.  Left glenohumeral and AC joint articulations are maintained.  AC joint DJD.                                 Medical Decision Making:   History:   Old Medical Records: I decided to obtain old medical records.  Old Records Summarized: records from clinic visits and records from previous admission(s).       <> Summary of Records: On chart review, Tdap updated on 12/15/2016.  Initial Assessment:   Patient is a 79-year-old female with a history of HTN, osteoporosis, depression who presents the ED with polyarthralgias status post fall on Thursday, 2 days ago.  Differential Diagnosis:   Includes but is not limited to superficial abrasion, soft tissue contusion, bony contusion, fractures, less likely dislocation.  No spinous process tenderness.  Head atraumatic.  Abdomen soft nondistended.  No chest wall tenderness. Doubt any spinal fractures or intrathoracic or abdominal pathology.  Denies red flag symptoms.  Low suspicion for cauda equina.  ED Management:  Will obtain x-ray of right shoulder given decreased range of motion and strength, give oral acetaminophen, apply ice, and reassess.    Right shoulder x-ray with no acute fracture or dislocation.  Glenohumeral and AC joint articulations are maintain.    Patient reassessed.  Updated with x-ray results.  She reports some improvement.  Given history, physical exam, and workup today, likely that patient is suffering from soft tissue and bony contusion.  I advised continuing acetaminophen and RICE therapy.  She was educated on pendulum exercises for her shoulder.  Follow up with sports medicine in 1 week if symptoms do not improve.  Patient and her  voiced their understanding, and patient is stable for discharge.                                  Clinical Impression:       ICD-10-CM ICD-9-CM   1. Contusion of right shoulder, initial encounter S40.011A 923.00   2. Fall W19.XXXA E888.9         Disposition:   Disposition: Discharged  Condition: Stable     ED Disposition Condition    Discharge Stable        ED Prescriptions     None        Follow-up Information     Follow up With Specialties Details Why Contact Info Additional Information    [facility] Sports Medicine Sports Medicine Schedule an appointment as soon as possible for a visit   [address] [facility] [address] [phone] [facility]    Lili Yi MD Internal Medicine Schedule an appointment as soon as possible for a visit   [address]  [place] [address]  [phone]       [facility] Emergency Medicine  If symptoms worsen [address] [facility] [id number] [phone]                                      Alisha Santacruz PA-C  02/29/20 1100

## 2020-02-29 NOTE — DISCHARGE INSTRUCTIONS
Continue take acetaminophen 650 mg every 6 hr as needed for your pain.    Apply ice to the area for 20 min 4 times a day.    Keep your shoulder mobile by performing frequent exercises.    Follow up closely with sports medicine if her symptoms do not improve for further evaluation.    Return to the emergency department for new or worsening symptoms.    Imaging Results              X-Ray Shoulder Trauma Right (Final result)  Result time 02/29/20 10:41:08      Final result by Donaldo Wesley MD (02/29/20 10:41:08)                   Impression:      As above.      Electronically signed by: Donaldo Wesley  Date:    02/29/2020  Time:    10:41               Narrative:    EXAMINATION:  XR SHOULDER TRAUMA 3 VIEW RIGHT    CLINICAL HISTORY:  Unspecified fall, initial encounter    TECHNIQUE:  Three or four views of the right shoulder were performed.    COMPARISON:  None available    FINDINGS:  No acute fracture or dislocation.  Left glenohumeral and AC joint articulations are maintained.  AC joint DJD.

## 2020-03-03 ENCOUNTER — OFFICE VISIT (OUTPATIENT)
Dept: INTERNAL MEDICINE | Facility: CLINIC | Age: 80
End: 2020-03-03
Payer: MEDICARE

## 2020-03-03 ENCOUNTER — HOSPITAL ENCOUNTER (OUTPATIENT)
Dept: RADIOLOGY | Facility: HOSPITAL | Age: 80
Discharge: HOME | End: 2020-03-03
Attending: INTERNAL MEDICINE
Payer: MEDICARE

## 2020-03-03 DIAGNOSIS — M25.511 RIGHT SHOULDER PAIN, UNSPECIFIED CHRONICITY: ICD-10-CM
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
## 2020-03-03 DIAGNOSIS — Z78.0 POSTMENOPAUSAL: ICD-10-CM

## 2020-03-03 DIAGNOSIS — I10 ESSENTIAL HYPERTENSION: ICD-10-CM

## 2020-03-03 DIAGNOSIS — M54.9 BACK PAIN, UNSPECIFIED BACK LOCATION, UNSPECIFIED BACK PAIN LATERALITY, UNSPECIFIED CHRONICITY: ICD-10-CM

## 2020-03-03 DIAGNOSIS — Z12.31 ENCOUNTER FOR SCREENING MAMMOGRAM FOR BREAST CANCER: ICD-10-CM

## 2020-03-03 DIAGNOSIS — L98.9 SKIN LESION: ICD-10-CM

## 2020-03-03 DIAGNOSIS — Z00.00 ANNUAL PHYSICAL EXAM: Primary | ICD-10-CM

## 2020-03-03 PROCEDURE — 99999 PR PBB SHADOW E&M-EST. PATIENT-LVL V: ICD-10-PCS | Mod: PBBFAC,HCNC,, | Performed by: INTERNAL MEDICINE

## 2020-03-03 PROCEDURE — 3078F PR MOST RECENT DIASTOLIC BLOOD PRESSURE < 80 MM HG: ICD-10-PCS | Mod: HCNC,CPTII,S$GLB, | Performed by: INTERNAL MEDICINE

## 2020-03-03 PROCEDURE — 3078F DIAST BP <80 MM HG: CPT | Mod: HCNC,CPTII,S$GLB, | Performed by: INTERNAL MEDICINE

## 2020-03-03 PROCEDURE — 3077F PR MOST RECENT SYSTOLIC BLOOD PRESSURE >= 140 MM HG: ICD-10-PCS | Mod: HCNC,CPTII,S$GLB, | Performed by: INTERNAL MEDICINE

## 2020-03-03 PROCEDURE — 72080 X-RAY EXAM THORACOLMB 2/> VW: CPT | Mod: TC,HCNC

## 2020-03-03 PROCEDURE — 99999 PR PBB SHADOW E&M-EST. PATIENT-LVL V: CPT | Mod: PBBFAC,HCNC,, | Performed by: INTERNAL MEDICINE

## 2020-03-03 PROCEDURE — 99397 PER PM REEVAL EST PAT 65+ YR: CPT | Mod: HCNC,S$GLB,, | Performed by: INTERNAL MEDICINE

## 2020-03-03 PROCEDURE — 99397 PR PREVENTIVE VISIT,EST,65 & OVER: ICD-10-PCS | Mod: HCNC,S$GLB,, | Performed by: INTERNAL MEDICINE

## 2020-03-03 PROCEDURE — 72080 XR THORACOLUMBAR SPINE AP LATERAL: ICD-10-PCS | Mod: 26,HCNC,, | Performed by: RADIOLOGY

## 2020-03-03 PROCEDURE — 3077F SYST BP >= 140 MM HG: CPT | Mod: HCNC,CPTII,S$GLB, | Performed by: INTERNAL MEDICINE

## 2020-03-03 PROCEDURE — 72080 X-RAY EXAM THORACOLMB 2/> VW: CPT | Mod: 26,HCNC,, | Performed by: RADIOLOGY

## 2020-03-03 RX ORDER — LISINOPRIL 10 MG/1
10 TABLET ORAL DAILY
Qty: 90 TABLET | Refills: 3 | Status: SHIPPED | OUTPATIENT
Start: 2020-03-03 | End: 2021-04-04 | Stop reason: SDUPTHER

## 2020-03-04 ENCOUNTER — TELEPHONE (OUTPATIENT)
Dept: INTERNAL MEDICINE | Facility: CLINIC | Age: 80
End: 2020-03-04

## 2020-03-04 VITALS
SYSTOLIC BLOOD PRESSURE: 132 MMHG | OXYGEN SATURATION: 96 % | HEART RATE: 90 BPM | DIASTOLIC BLOOD PRESSURE: 72 MMHG | HEIGHT: 61 IN | BODY MASS INDEX: 29.89 KG/M2 | WEIGHT: 158.31 LBS

## 2020-03-04 DIAGNOSIS — N28.9 RENAL INSUFFICIENCY: Primary | ICD-10-CM

## 2020-03-04 PROBLEM — I77.1 TORTUOUS AORTA: Status: RESOLVED | Noted: 2017-10-03 | Resolved: 2020-03-04

## 2020-03-04 PROBLEM — M25.511 RIGHT SHOULDER PAIN: Status: ACTIVE | Noted: 2020-03-04

## 2020-03-04 NOTE — TELEPHONE ENCOUNTER
----- Message from Lili Yi MD sent at 3/4/2020 12:10 PM CST -----  Call and notify pt Taj[place] speaking via interpretor if needed  Her xray showed some old changes /arthirtis , but they did not think any new fractures  Seen, her labs were acceptable her cholesterol stable  And rest of labs normal,   overall kidney funciton showed slight trend down make sure she stays hydrated and I   And would like to scheudle ultrasound of her kidney to evaluate further as well order in assist in scheudling

## 2020-03-04 NOTE — PROGRESS NOTES
"Subjective:       Patient ID: Milady Blanco is a 79 y.o. female.    Chief Complaint: Annual Exam  and er follow up    This is a 71-year-old who presents today for physical.    present as requested Patient reports that she has been doing fairly well she continues to take her lisinopril and tolerates it without difficulty her blood pressure has been well controlled.  She had been doing fairly well but reports that she had a fall where she tripped and she scraped her knee fell on her right shoulder and has had some pain and difficulty with range of motion of her shoulder since that time.  Patient reports prior to the fall she had no difficulty with range of motion of her shoulder she has been unable to raise it very far since that episode.  She would also like to get her blood work and mammogram scheduled since she is due.  She denies significant anxiety and is no longer taking Zoloft she has been doing well without it.      HPI  Review of Systems   Constitutional: Negative for fever.   Respiratory: Negative for cough, shortness of breath and wheezing.    Cardiovascular: Negative for chest pain.   Gastrointestinal: Negative for abdominal pain.   Musculoskeletal: Positive for back pain.        Right shoulder pain decreased rom    Right knee discomfort abrasion  And back after fall    Neurological: Negative for dizziness.       Objective:     Blood pressure (!) 140/82, pulse 90, height 5' 1" (1.549 m), weight 71.8 kg (158 lb 4.6 oz), SpO2 96 %.    Physical Exam   Constitutional: No distress.   HENT:   Head: Normocephalic.   Mouth/Throat: Oropharynx is clear and moist.   Eyes: No scleral icterus.   Neck: Neck supple.   Cardiovascular: Normal rate, regular rhythm and normal heart sounds. Exam reveals no gallop and no friction rub.   No murmur heard.  Pulmonary/Chest: Effort normal and breath sounds normal. No respiratory distress.   Breast : normal no masses or tenderness    Abdominal: Soft. Bowel sounds " are normal. She exhibits no mass. There is no tenderness.   Musculoskeletal: She exhibits no edema.   Some low back disomfort to palpation rom no deformity  abrsaion knee healing well    Right shoulder pain and very reduced rom on exam    Neurological: She is alert.   Skin: No erythema.   Skin growth  Hyperpigmentation cheek    Psychiatric: She has a normal mood and affect.   Vitals reviewed.      Assessment:       1. Annual physical exam    2. Right shoulder pain, unspecified chronicity    3. Postmenopausal    4. Encounter for screening mammogram for breast cancer    5. Skin lesion    6. Essential hypertension    7. Back pain, unspecified back location, unspecified back pain laterality, unspecified chronicity    8. Fall, sequela        Plan:       Milady was seen today for annual exam.    Diagnoses and all orders for this visit:    Annual physical exam  -     Basic metabolic panel; Future  -     CBC auto differential; Future  -     Hepatic function panel; Future  -     Lipid panel; Future  -     TSH; Future    Right shoulder pain, unspecified chronicity  After recent fall she has severely reduced range of motion for the last few days recommended Ortho/Sports Medicine referral she would like to see  surgery was recently operated on her  referral placed for scheduling  Discussed gentle rom exercises  She has been taking tylenol for her discomfort at this time   -     Ambulatory referral/consult to Sports Medicine; Future    Postmenopausal  -     DXA Bone Density Spine And Hip; Future    Encounter for screening mammogram for breast cancer  Schedule annual when due  -     Mammo Digital Screening Bilat w/ Saeed; Future    Skin lesion  Patient has concerns about area on her cheek and others referral to Dermatology for scheduling  -     Ambulatory referral/consult to Dermatology; Future    Essential hypertension  Blood pressure acceptable we discussed risks benefits of medications and is considering switching  for now she would like to continue with her present regimen  -     Basic metabolic panel; Future  -     CBC auto differential; Future  -     Hepatic function panel; Future  -     Lipid panel; Future  -     TSH; Future  -     lisinopril 10 MG tablet; Take 1 tablet (10 mg total) by mouth once daily.    Back pain, unspecified back location, unspecified back pain laterality, unspecified chronicit  Fall, sequela  Will check x-rays and review  -     X-Ray Thoracolumbar Spine AP Lateral; Future    Labs/test and review   She had her flu shot  She is up to ate on colonoscopy

## 2020-03-09 ENCOUNTER — OFFICE VISIT (OUTPATIENT)
Dept: SPORTS MEDICINE | Facility: CLINIC | Age: 80
End: 2020-03-09
Payer: MEDICARE

## 2020-03-09 VITALS
DIASTOLIC BLOOD PRESSURE: 76 MMHG | SYSTOLIC BLOOD PRESSURE: 124 MMHG | HEART RATE: 98 BPM | HEIGHT: 61 IN | WEIGHT: 158 LBS | BODY MASS INDEX: 29.83 KG/M2

## 2020-03-09 DIAGNOSIS — M25.511 ACUTE PAIN OF RIGHT SHOULDER: ICD-10-CM

## 2020-03-09 PROCEDURE — 99999 PR PBB SHADOW E&M-EST. PATIENT-LVL III: CPT | Mod: PBBFAC,HCNC,, | Performed by: ORTHOPAEDIC SURGERY

## 2020-03-09 PROCEDURE — 99999 PR PBB SHADOW E&M-EST. PATIENT-LVL III: ICD-10-PCS | Mod: PBBFAC,HCNC,, | Performed by: ORTHOPAEDIC SURGERY

## 2020-03-09 PROCEDURE — 3074F PR MOST RECENT SYSTOLIC BLOOD PRESSURE < 130 MM HG: ICD-10-PCS | Mod: HCNC,CPTII,S$GLB, | Performed by: ORTHOPAEDIC SURGERY

## 2020-03-09 PROCEDURE — 1159F MED LIST DOCD IN RCRD: CPT | Mod: HCNC,S$GLB,, | Performed by: ORTHOPAEDIC SURGERY

## 2020-03-09 PROCEDURE — 1101F PT FALLS ASSESS-DOCD LE1/YR: CPT | Mod: HCNC,CPTII,S$GLB, | Performed by: ORTHOPAEDIC SURGERY

## 2020-03-09 PROCEDURE — 3078F DIAST BP <80 MM HG: CPT | Mod: HCNC,CPTII,S$GLB, | Performed by: ORTHOPAEDIC SURGERY

## 2020-03-09 PROCEDURE — 3078F PR MOST RECENT DIASTOLIC BLOOD PRESSURE < 80 MM HG: ICD-10-PCS | Mod: HCNC,CPTII,S$GLB, | Performed by: ORTHOPAEDIC SURGERY

## 2020-03-09 PROCEDURE — 1159F PR MEDICATION LIST DOCUMENTED IN MEDICAL RECORD: ICD-10-PCS | Mod: HCNC,S$GLB,, | Performed by: ORTHOPAEDIC SURGERY

## 2020-03-09 PROCEDURE — 1125F PR PAIN SEVERITY QUANTIFIED, PAIN PRESENT: ICD-10-PCS | Mod: HCNC,S$GLB,, | Performed by: ORTHOPAEDIC SURGERY

## 2020-03-09 PROCEDURE — 99203 OFFICE O/P NEW LOW 30 MIN: CPT | Mod: HCNC,S$GLB,, | Performed by: ORTHOPAEDIC SURGERY

## 2020-03-09 PROCEDURE — 99203 PR OFFICE/OUTPT VISIT, NEW, LEVL III, 30-44 MIN: ICD-10-PCS | Mod: HCNC,S$GLB,, | Performed by: ORTHOPAEDIC SURGERY

## 2020-03-09 PROCEDURE — 3074F SYST BP LT 130 MM HG: CPT | Mod: HCNC,CPTII,S$GLB, | Performed by: ORTHOPAEDIC SURGERY

## 2020-03-09 PROCEDURE — 1125F AMNT PAIN NOTED PAIN PRSNT: CPT | Mod: HCNC,S$GLB,, | Performed by: ORTHOPAEDIC SURGERY

## 2020-03-09 PROCEDURE — 1101F PR PT FALLS ASSESS DOC 0-1 FALLS W/OUT INJ PAST YR: ICD-10-PCS | Mod: HCNC,CPTII,S$GLB, | Performed by: ORTHOPAEDIC SURGERY

## 2020-03-09 NOTE — LETTER
March 9, 2020      Lili Yi MD  [address]  [facility] [address]           [facility]  [address]  [facility] [address]  Phone: [phone]          Patient: Milady Blanco   MR Number: [id number]   YOB: 1940   Date of Visit: 3/9/2020       Dear Dr. Lili Yi:    Thank you for referring Milady Blanco to me for evaluation. Attached you will find relevant portions of my assessment and plan of care.    If you have questions, please do not hesitate to call me. I look forward to following Milady Blanco along with you.    Sincerely,    Fiorella Murguia MD    Enclosure  CC:  No Recipients    If you would like to receive this communication electronically, please contact [email or web address] or [phone] to request more information on [employer] access.    For providers and/or their staff who would like to refer a patient to [facility], please contact us through our one-stop-shop provider referral line, [facility] , at [phone].    If you feel you have received this communication in error or would no longer like to receive these types of communications, please e-mail [email or web address]

## 2020-03-09 NOTE — PROGRESS NOTES
CC: right shoulder pain, patient is retired, referred by Dr. Yi      79 y.o. Female RHD reports that the pain is severe and not responding to any conservative care.      2/27/20 patient had a fall   She notes that she fell forward but her shoulder flew backwards     Her pain has gotten better since her fall   She is having difficulty with range of motion     She reports that the pain is worse with overhead activity. It also bothers her at night.  She describes her pain as lateral   She notes bruising following the injury     Is affecting ADLs.     Tylenol that has helped   She has also used ice and heat     Review of Systems   Constitution: Negative. Negative for chills, fever and night sweats.   HENT: Negative for congestion and headaches.    Eyes: Negative for blurred vision, left vision loss and right vision loss.   Cardiovascular: Negative for chest pain and syncope.   Respiratory: Negative for cough and shortness of breath.    Endocrine: Negative for polydipsia, polyphagia and polyuria.   Hematologic/Lymphatic: Negative for bleeding problem. Does not bruise/bleed easily.   Skin: Negative for dry skin, itching and rash.   Musculoskeletal: Negative for falls and muscle weakness.   Gastrointestinal: Negative for abdominal pain and bowel incontinence.   Genitourinary: Negative for bladder incontinence and nocturia.   Neurological: Negative for disturbances in coordination, loss of balance and seizures.   Psychiatric/Behavioral: Negative for depression. The patient does not have insomnia.    Allergic/Immunologic: Negative for hives and persistent infections.     PAST MEDICAL HISTORY:   Past Medical History:   Diagnosis Date    Asymptomatic cholelithiasis     Colon polyp     Depression     Eosinophilic granuloma of lung     Gastritis     GERD (gastroesophageal reflux disease)     hiatal hernia     History of epistaxis     Hypertension     Osteoporosis, senile     Pelvic relaxation     with pessary       PAST SURGICAL HISTORY:   Past Surgical History:   Procedure Laterality Date    CARPAL TUNNEL RELEASE      Left    COLONOSCOPY N/A 10/20/2016    Procedure: COLONOSCOPY;  Surgeon: Arnel Bernard MD;  Location: [facility] ([address] [place] [address]);  Service: Endoscopy;  Laterality: N/A;    Excision of neurofibroma      EYE SURGERY      Eyelid surgery      HYSTERECTOMY       FAMILY HISTORY:   Family History   Problem Relation Age of Onset    Coronary artery disease Mother     Heart disease Mother     No Known Problems Father     Coronary artery disease Maternal Grandmother     Heart disease Maternal Grandmother     Hypertension Paternal Uncle     Breast cancer Maternal Aunt     Cancer Maternal Aunt         breast cancer     Pancreatic cancer Cousin     Cancer Cousin         pancreatic cancer    Breast cancer Cousin     Heart disease Maternal Uncle     Breast cancer Cousin     No Known Problems Sister     No Known Problems Brother     No Known Problems Paternal Aunt     No Known Problems Maternal Grandfather     No Known Problems Paternal Grandmother     No Known Problems Paternal Grandfather     No Known Problems Brother     Multiple sclerosis Daughter     Colon cancer Neg Hx     Ovarian cancer Neg Hx     Amblyopia Neg Hx     Blindness Neg Hx     Cataracts Neg Hx     Diabetes Neg Hx     Glaucoma Neg Hx     Macular degeneration Neg Hx     Retinal detachment Neg Hx     Strabismus Neg Hx     Stroke Neg Hx     Thyroid disease Neg Hx      SOCIAL HISTORY:   Social History     Socioeconomic History    Marital status:      Spouse name: Not on file    Number of children: Not on file    Years of education: Not on file    Highest education level: Not on file   Occupational History    Not on file   Social Needs    Financial resource strain: Not on file    Food insecurity:     Worry: Not on file     Inability: Not on file    Transportation needs:     Medical: Not on file     Non-medical: Not  "on file   Tobacco Use    Smoking status: Never Smoker    Smokeless tobacco: Never Used   Substance and Sexual Activity    Alcohol use: No    Drug use: No    Sexual activity: Not Currently     Birth control/protection: None   Lifestyle    Physical activity:     Days per week: Not on file     Minutes per session: Not on file    Stress: Not on file   Relationships    Social connections:     Talks on phone: Not on file     Gets together: Not on file     Attends Baptism service: Not on file     Active member of club or organization: Not on file     Attends meetings of clubs or organizations: Not on file     Relationship status: Not on file   Other Topics Concern    Not on file   Social History Narrative    Not on file       MEDICATIONS:   Current Outpatient Medications:     calcium carbonate (OS-DAVID) 600 mg (1,500 mg) Tab, Take 600 mg by mouth 2 (two) times daily with meals., Disp: , Rfl:     cholecalciferol, vitamin D3, 1,000 unit capsule, Take 1 capsule (1,000 Units total) by mouth once daily., Disp: , Rfl: 0    lisinopril 10 MG tablet, Take 1 tablet (10 mg total) by mouth once daily., Disp: 90 tablet, Rfl: 3    milk thistle 175 mg tablet, Take 175 mg by mouth once daily., Disp: , Rfl:     vitamin E 100 UNIT capsule, Take 100 Units by mouth once daily., Disp: , Rfl:   ALLERGIES: Review of patient's allergies indicates:  No Known Allergies    VITAL SIGNS: /76   Pulse 98   Ht 5' 1" (1.549 m)   Wt 71.7 kg (158 lb)   BMI 29.85 kg/m²      PHYSICAL EXAMINATION:  General:  The patient is alert and oriented x 3.  Mood is pleasant.  Observation of ears, eyes and nose reveal no gross abnormalities.  No labored breathing observed.  Gait is coordinated. Patient can toe walk and heel walk without difficulty.      right Shoulder / Upper Extremity Exam    OBSERVATION:     Swelling  none  Deformity  none   Discoloration  none   Scapular winging none   Scars   none  Atrophy  none    TENDERNESS / CREPITUS " (T/C):          T/C      T/C   Clavicle   -/-  SUPRAspinatus    -/-     AC Jt.    -/-  INFRAspinatus  -/-    SC Jt.    -/-  Deltoid    -/-      G. Tuberosity  -/-  LH BICEP groove  +/-   Acromion:  -/-  Midline Neck   -/-     Scapular Spine -/-  Trapezium   -/-   SMA Scapula  -/-  GH jt. line - post  -/-     Scapulothoracic  -/-         ROM: (* = with pain)  Right shoulder   Left shoulder        AROM (PROM)   AROM (PROM)   FE    145° (155°)     170° (175°)     ER at 0°    30°  (65°)    30°  (65°)   ER at 90° ABD  90°  (90°)    90°  (90°)   IR at 90°  ABD   NA  (40°)     NA  (40°)      IR (spine level)   L4     T10    STRENGTH: (* = with pain) RIGHT SHOULDER  LEFT SHOULDER   SCAPTION at 0  5/5    5/5   SCAPTION at 30  5/5    5/5    IR    5/5    5/5   ER    5/5    5/5   BICEPS   5/5    5/5   Deltoid    5/5    5/5     SIGNS:  Painful side       NEER   +    ODANIAS  neg    SAUCEDA   neg    SPEEDS  +     DROP ARM   neg   BELLY PRESS neg   Superior escape none    LIFT-OFF  neg   X-Body ADD    neg    MOVING VALGUS neg        STABILITY TESTING    RIGHT SHOULDER   LEFT SHOULDER     Translation     Anterior  up face     up face    Posterior  up face    up face    Sulcus   < 10mm    < 10 mm     Signs   Apprehension   neg      neg       Relocation   no change     no change      Jerk test  neg     neg    EXTREMITY NEURO-VASCULAR EXAM    Sensation grossly intact to light touch all dermatomal regions.    DTR 2+ Biceps, Triceps, BR and Negative Joses sign   Grossly intact motor function at Elbow, Wrist and Hand   Distal pulses radial and ulnar 2+, brisk cap refill, symmetric.      NECK:  Painless FROM and spinous processes non-tender. Negative Spurlings sign.      OTHER FINDINGS:  + minimal scapular dyskinesia    XRAYS:  Shoulder trauma series right,  were obtained and reviewed  The osseous structures appear well mineralized and well aligned    Irregularity at the greater tuberosity       ASSESSMENT:   right:  1.  Shoulder pain   2.  Scapular dyskinesia    PLAN:    Follow up as needed, will follow up should she not feel better in 2-3 months     PT for scapular stabilization: Scapular dyskinesia   Scapular stabilization - Angelica protocol, 1-3x/week x 6-8 weeks with HEP    All questions were answered, pt will contact us for questions or concerns in the interim.

## 2020-03-11 ENCOUNTER — HOSPITAL ENCOUNTER (OUTPATIENT)
Dept: RADIOLOGY | Facility: HOSPITAL | Age: 80
Discharge: HOME | End: 2020-03-11
Attending: INTERNAL MEDICINE
Payer: MEDICARE

## 2020-03-11 ENCOUNTER — HOSPITAL ENCOUNTER (OUTPATIENT)
Dept: RADIOLOGY | Facility: CLINIC | Age: 80
Discharge: HOME | End: 2020-03-11
Attending: INTERNAL MEDICINE
Payer: MEDICARE

## 2020-03-11 DIAGNOSIS — Z78.0 POSTMENOPAUSAL: ICD-10-CM

## 2020-03-11 DIAGNOSIS — N28.9 RENAL INSUFFICIENCY: ICD-10-CM

## 2020-03-11 PROCEDURE — 76770 US EXAM ABDO BACK WALL COMP: CPT | Mod: 26,HCNC,, | Performed by: RADIOLOGY

## 2020-03-11 PROCEDURE — 76770 US EXAM ABDO BACK WALL COMP: CPT | Mod: TC,HCNC

## 2020-03-11 PROCEDURE — 77080 DXA BONE DENSITY AXIAL: CPT | Mod: 26,HCNC,, | Performed by: INTERNAL MEDICINE

## 2020-03-11 PROCEDURE — 77080 DXA BONE DENSITY AXIAL: CPT | Mod: TC,HCNC

## 2020-03-11 PROCEDURE — 76770 US RETROPERITONEAL COMPLETE: ICD-10-PCS | Mod: 26,HCNC,, | Performed by: RADIOLOGY

## 2020-03-11 PROCEDURE — 77080 DEXA BONE DENSITY SPINE HIP: ICD-10-PCS | Mod: 26,HCNC,, | Performed by: INTERNAL MEDICINE

## 2020-03-12 ENCOUNTER — TELEPHONE (OUTPATIENT)
Dept: INTERNAL MEDICINE | Facility: CLINIC | Age: 80
End: 2020-03-12

## 2020-03-12 NOTE — TELEPHONE ENCOUNTER
----- Message from Lili Yi MD sent at 3/11/2020  4:31 PM CDT -----    Call and notify pt Danish speaking her kidney ultrasound was normal

## 2020-03-12 NOTE — TELEPHONE ENCOUNTER
Called and spoke with Ms. Blanco to give her US results she verbally understood she had no additional questions.

## 2020-03-20 ENCOUNTER — TELEPHONE (OUTPATIENT)
Dept: REHABILITATION | Facility: HOSPITAL | Age: 80
End: 2020-03-20

## 2020-03-20 NOTE — TELEPHONE ENCOUNTER
Postponed Appointments  Patient: Milady Blanco  Date: 3/20/2020  Diagnosis: No diagnosis found.  MRN: [id number]  LVM with patient due to therapy following updates regarding COVID-19 closely and taking every precaution to ensure the safety of our patients, staff and community. In an abundance of caution and in an effort to help reduce risk and limit community spread, we have decided to temporarily postpone appointments for patients who may be at increased risk to attend in-person therapy or where therapy is not critically needed at this time. Requsted patient call back to discuss home exercise program in the interim.    Keena Mathias, PT, DPT     3/20/2020

## 2020-03-26 ENCOUNTER — TELEPHONE (OUTPATIENT)
Dept: INTERNAL MEDICINE | Facility: CLINIC | Age: 80
End: 2020-03-26

## 2020-03-26 NOTE — TELEPHONE ENCOUNTER
Spoke with pt reveiwed with her  Via interpretor   Bone density and treatment options  She had gi intolerance with actonel  Would like to continue  calcium/vitamin d   Repeat in 2 years

## 2020-04-14 ENCOUNTER — TELEPHONE (OUTPATIENT)
Dept: DERMATOLOGY | Facility: CLINIC | Age: 80
End: 2020-04-14

## 2020-05-05 ENCOUNTER — TELEPHONE (OUTPATIENT)
Dept: DERMATOLOGY | Facility: CLINIC | Age: 80
End: 2020-05-05

## 2020-05-27 ENCOUNTER — HOSPITAL ENCOUNTER (OUTPATIENT)
Dept: RADIOLOGY | Facility: HOSPITAL | Age: 80
Discharge: HOME | End: 2020-05-27
Attending: INTERNAL MEDICINE
Payer: MEDICARE

## 2020-05-27 DIAGNOSIS — Z12.31 ENCOUNTER FOR SCREENING MAMMOGRAM FOR BREAST CANCER: ICD-10-CM

## 2020-05-27 PROCEDURE — 77067 MAMMO DIGITAL SCREENING BILAT WITH TOMOSYNTHESIS_CAD: ICD-10-PCS | Mod: 26,HCNC,, | Performed by: RADIOLOGY

## 2020-05-27 PROCEDURE — 77067 SCR MAMMO BI INCL CAD: CPT | Mod: TC,HCNC

## 2020-05-27 PROCEDURE — 77063 BREAST TOMOSYNTHESIS BI: CPT | Mod: 26,HCNC,, | Performed by: RADIOLOGY

## 2020-05-27 PROCEDURE — 77063 MAMMO DIGITAL SCREENING BILAT WITH TOMOSYNTHESIS_CAD: ICD-10-PCS | Mod: 26,HCNC,, | Performed by: RADIOLOGY

## 2020-05-27 PROCEDURE — 77067 SCR MAMMO BI INCL CAD: CPT | Mod: 26,HCNC,, | Performed by: RADIOLOGY

## 2020-06-18 ENCOUNTER — OFFICE VISIT (OUTPATIENT)
Dept: DERMATOLOGY | Facility: CLINIC | Age: 80
End: 2020-06-18
Payer: MEDICARE

## 2020-06-18 DIAGNOSIS — L30.4 INTERTRIGO: Primary | ICD-10-CM

## 2020-06-18 DIAGNOSIS — L98.9 SKIN LESION: ICD-10-CM

## 2020-06-18 PROCEDURE — 99999 PR PBB SHADOW E&M-EST. PATIENT-LVL III: CPT | Mod: PBBFAC,HCNC,, | Performed by: DERMATOLOGY

## 2020-06-18 PROCEDURE — 99202 PR OFFICE/OUTPT VISIT, NEW, LEVL II, 15-29 MIN: ICD-10-PCS | Mod: HCNC,S$GLB,, | Performed by: DERMATOLOGY

## 2020-06-18 PROCEDURE — 99999 PR PBB SHADOW E&M-EST. PATIENT-LVL III: ICD-10-PCS | Mod: PBBFAC,HCNC,, | Performed by: DERMATOLOGY

## 2020-06-18 PROCEDURE — 99202 OFFICE O/P NEW SF 15 MIN: CPT | Mod: HCNC,S$GLB,, | Performed by: DERMATOLOGY

## 2020-06-18 RX ORDER — TRIAMCINOLONE ACETONIDE 1 MG/G
CREAM TOPICAL
Qty: 1 BOTTLE | Refills: 3 | Status: SHIPPED | OUTPATIENT
Start: 2020-06-18 | End: 2020-11-05 | Stop reason: SDUPTHER

## 2020-06-18 NOTE — LETTER
June 18, 2020      Lili Yi MD  [address]  [facility] [address]           [facility] - Dermatology  [address]  [place] [address]  Phone: [phone]  Fax: [phone]          Patient: Milady Blanco   MR Number: [id number]   YOB: 1940   Date of Visit: 6/18/2020       Dear Dr. Lili Yi:    Thank you for referring Milady Blanco to me for evaluation. Attached you will find relevant portions of my assessment and plan of care.    If you have questions, please do not hesitate to call me. I look forward to following Milady Blanco along with you.    Sincerely,    Chayito Monte MD    Enclosure  CC:  No Recipients    If you would like to receive this communication electronically, please contact [email or web address] or [phone] to request more information on [employer] access.    For providers and/or their staff who would like to refer a patient to [facility], please contact us through our one-stop-shop provider referral line, [facility], at [phone].    If you feel you have received this communication in error or would no longer like to receive these types of communications, please e-mail [email or web address]

## 2020-06-18 NOTE — PROGRESS NOTES
Subjective:       Patient ID:  Milady Blanco is a 79 y.o. female who presents for   Chief Complaint   Patient presents with    Rash     under breasts     Rash - Initial  Affected locations: chest  Duration: 3 weeks  Signs / symptoms: itching  Severity: mild to moderate  Timing: constant  Aggravated by: sweating  Treatments tried: calamine lotion.  Improvement on treatment: mild        Review of Systems   Skin: Positive for rash. Negative for daily sunscreen use, activity-related sunscreen use, recent sunburn and wears hat.   Hematologic/Lymphatic: Bruises/bleeds easily.        Objective:    Physical Exam   Constitutional: She appears well-developed and well-nourished.   Neurological: She is alert and oriented to person, place, and time.   Psychiatric: She has a normal mood and affect.   Skin:   Areas Examined (abnormalities noted in diagram):   Chest / Axilla Inspection Performed              Diagram Legend     Erythematous scaling macule/papule c/w actinic keratosis       Vascular papule c/w angioma      Pigmented verrucoid papule/plaque c/w seborrheic keratosis      Yellow umbilicated papule c/w sebaceous hyperplasia      Irregularly shaped tan macule c/w lentigo     1-2 mm smooth white papules consistent with Milia      Movable subcutaneous cyst with punctum c/w epidermal inclusion cyst      Subcutaneous movable cyst c/w pilar cyst      Firm pink to brown papule c/w dermatofibroma      Pedunculated fleshy papule(s) c/w skin tag(s)      Evenly pigmented macule c/w junctional nevus     Mildly variegated pigmented, slightly irregular-bordered macule c/w mildly atypical nevus      Flesh colored to evenly pigmented papule c/w intradermal nevus       Pink pearly papule/plaque c/w basal cell carcinoma      Erythematous hyperkeratotic cursted plaque c/w SCC      Surgical scar with no sign of skin cancer recurrence      Open and closed comedones      Inflammatory papules and pustules      Verrucoid papule  consistent consistent with wart     Erythematous eczematous patches and plaques     Dystrophic onycholytic nail with subungual debris c/w onychomycosis     Umbilicated papule    Erythematous-base heme-crusted tan verrucoid plaque consistent with inflamed seborrheic keratosis     Erythematous Silvery Scaling Plaque c/w Psoriasis     See annotation      Assessment / Plan:        Intertrigo  -     triamcinolone acetonide 0.1% (KENALOG) 0.1 % cream; AAA bid after cool blow dry  Dispense: 1 Bottle; Refill: 3  Recommend white vinegar: water 1:1 compresses 2x/day followed by cool blow dry and then application of prescription medication (mixture).     Cool blow dry after showering. Once clear, use Zeasorb AF powder for maintenance.    Skin lesion  -     Ambulatory referral/consult to Dermatology             No follow-ups on file.

## 2020-06-18 NOTE — PATIENT INSTRUCTIONS
Recommend white vinegar: water 1:1 compresses 2x/day followed by cool blow dry and then application of prescription medication.     Cool blow dry after showering. Once clear, use Zeasorb AF powder for maintenance.      Start Men's otc Rogaine once daily to scalp 5% solution  Use vaseline around hairline to prevent excessive hair growth  Encourage natural (chemical and heat-free) hair styles and limited styling products.  Counseling done on chronic nature of hair loss and that at least a 6 month trial must be done before stopping minoxidil  Pictures taken of hair loss today will compare through visit hx

## 2020-08-31 ENCOUNTER — PES CALL (OUTPATIENT)
Dept: ADMINISTRATIVE | Facility: CLINIC | Age: 80
End: 2020-08-31

## 2020-11-05 ENCOUNTER — OFFICE VISIT (OUTPATIENT)
Dept: DERMATOLOGY | Facility: CLINIC | Age: 80
End: 2020-11-05
Payer: MEDICARE

## 2020-11-05 DIAGNOSIS — D18.01 CHERRY ANGIOMA: ICD-10-CM

## 2020-11-05 DIAGNOSIS — D22.9 NEVUS: ICD-10-CM

## 2020-11-05 DIAGNOSIS — L30.4 INTERTRIGO: ICD-10-CM

## 2020-11-05 DIAGNOSIS — L82.1 SEBORRHEIC KERATOSES: Primary | ICD-10-CM

## 2020-11-05 PROCEDURE — 99999 PR PBB SHADOW E&M-EST. PATIENT-LVL II: CPT | Mod: PBBFAC,HCNC,, | Performed by: DERMATOLOGY

## 2020-11-05 PROCEDURE — 99214 OFFICE O/P EST MOD 30 MIN: CPT | Mod: HCNC,S$GLB,, | Performed by: DERMATOLOGY

## 2020-11-05 PROCEDURE — 99214 PR OFFICE/OUTPT VISIT, EST, LEVL IV, 30-39 MIN: ICD-10-PCS | Mod: HCNC,S$GLB,, | Performed by: DERMATOLOGY

## 2020-11-05 PROCEDURE — 99999 PR PBB SHADOW E&M-EST. PATIENT-LVL II: ICD-10-PCS | Mod: PBBFAC,HCNC,, | Performed by: DERMATOLOGY

## 2020-11-05 NOTE — PROGRESS NOTES
Subjective:       Patient ID:  Milady Blanco is a 80 y.o. female who presents for   Chief Complaint   Patient presents with    Follow-up     rash is stable     Spots and/or Floaters     face and both arms      Follow-up - Follow-up  Symptom course: stable  Affected locations: abdomen  Signs / symptoms: asymptomatic    New spots underneath breasts and hands, x 3 mo, scaly no previous tx    Review of Systems   Constitutional: Negative for fever, chills and fatigue.   Skin: Negative for daily sunscreen use, recent sunburn and wears hat.   Hematologic/Lymphatic: Does not bruise/bleed easily.        Objective:    Physical Exam   Constitutional: She appears well-developed and well-nourished.   Neurological: She is alert and oriented to person, place, and time.   Psychiatric: She has a normal mood and affect.   Skin:   Areas Examined (abnormalities noted in diagram):   Chest / Axilla Inspection Performed  RUE Inspected  LUE Inspection Performed              Diagram Legend     Erythematous scaling macule/papule c/w actinic keratosis       Vascular papule c/w angioma      Pigmented verrucoid papule/plaque c/w seborrheic keratosis      Yellow umbilicated papule c/w sebaceous hyperplasia      Irregularly shaped tan macule c/w lentigo     1-2 mm smooth white papules consistent with Milia      Movable subcutaneous cyst with punctum c/w epidermal inclusion cyst      Subcutaneous movable cyst c/w pilar cyst      Firm pink to brown papule c/w dermatofibroma      Pedunculated fleshy papule(s) c/w skin tag(s)      Evenly pigmented macule c/w junctional nevus     Mildly variegated pigmented, slightly irregular-bordered macule c/w mildly atypical nevus      Flesh colored to evenly pigmented papule c/w intradermal nevus       Pink pearly papule/plaque c/w basal cell carcinoma      Erythematous hyperkeratotic cursted plaque c/w SCC      Surgical scar with no sign of skin cancer recurrence      Open and closed comedones       Inflammatory papules and pustules      Verrucoid papule consistent consistent with wart     Erythematous eczematous patches and plaques     Dystrophic onycholytic nail with subungual debris c/w onychomycosis     Umbilicated papule    Erythematous-base heme-crusted tan verrucoid plaque consistent with inflamed seborrheic keratosis     Erythematous Silvery Scaling Plaque c/w Psoriasis     See annotation      Assessment / Plan:   Rule Out - Biopsy 1: Hypertrophic Actinic Keratosis.          Seborrheic keratoses  These are benign inherited growths without a malignant potential. Reassurance given to patient. No treatment is necessary.     Intertrigo- improved with vinegar: water , will start shake lotion when flaring  -     triamcinolone acetonide 0.1% (KENALOG) 0.1 % cream; AAA bid after cool blow dry  Dispense: 1 Bottle; Refill: 3  Recommend white vinegar: water 1:1 compresses 2x/day followed by cool blow dry and then application of prescription medication.     Cool blow dry after showering. Once clear, use Zeasorb AF powder for maintenance.  Cherry angioma  This is a benign vascular lesion. Reassurance given. No treatment required.     Nevus  Discussed ABCDE's of nevi.  Monitor for new mole or moles that are becoming bigger, darker, irritated, or developing irregular borders. Brochure provided.    F/u 3 mo for TBSE and recheck of intertrigo           No follow-ups on file.

## 2021-01-17 ENCOUNTER — IMMUNIZATION (OUTPATIENT)
Dept: FAMILY MEDICINE | Facility: CLINIC | Age: 81
End: 2021-01-17
Payer: MEDICARE

## 2021-01-17 DIAGNOSIS — Z23 NEED FOR VACCINATION: Primary | ICD-10-CM

## 2021-01-17 PROCEDURE — 91300 COVID-19, MRNA, LNP-S, PF, 30 MCG/0.3 ML DOSE VACCINE: CPT | Mod: PBBFAC | Performed by: FAMILY MEDICINE

## 2021-02-06 ENCOUNTER — IMMUNIZATION (OUTPATIENT)
Dept: FAMILY MEDICINE | Facility: CLINIC | Age: 81
End: 2021-02-06
Payer: MEDICARE

## 2021-02-06 DIAGNOSIS — Z23 NEED FOR VACCINATION: Primary | ICD-10-CM

## 2021-02-06 PROCEDURE — 0002A COVID-19, MRNA, LNP-S, PF, 30 MCG/0.3 ML DOSE VACCINE: CPT | Mod: PBBFAC | Performed by: FAMILY MEDICINE

## 2021-02-06 PROCEDURE — 91300 COVID-19, MRNA, LNP-S, PF, 30 MCG/0.3 ML DOSE VACCINE: CPT | Mod: PBBFAC | Performed by: FAMILY MEDICINE

## 2022-01-07 DIAGNOSIS — I10 ESSENTIAL HYPERTENSION: ICD-10-CM

## 2022-01-07 RX ORDER — LISINOPRIL 10 MG/1
TABLET ORAL
Qty: 90 TABLET | Refills: 0 | Status: SHIPPED | OUTPATIENT
Start: 2022-01-07 | End: 2022-06-24

## 2022-01-07 NOTE — TELEPHONE ENCOUNTER
No new care gaps identified.  Powered by [employer]. Reference number: [id number].   1/07/2022 1:39:50 PM CST

## 2023-07-12 ENCOUNTER — PES CALL (OUTPATIENT)
Dept: ADMINISTRATIVE | Facility: CLINIC | Age: 83
End: 2023-07-12
Payer: MEDICARE

## 2023-08-02 DIAGNOSIS — Z78.0 MENOPAUSE: ICD-10-CM

## 2023-08-03 ENCOUNTER — OFFICE VISIT (OUTPATIENT)
Dept: INTERNAL MEDICINE | Facility: CLINIC | Age: 83
End: 2023-08-03
Payer: MEDICARE

## 2023-08-03 ENCOUNTER — IMMUNIZATION (OUTPATIENT)
Dept: INTERNAL MEDICINE | Facility: CLINIC | Age: 83
End: 2023-08-03
Payer: MEDICARE

## 2023-08-03 ENCOUNTER — HOSPITAL ENCOUNTER (OUTPATIENT)
Dept: RADIOLOGY | Facility: HOSPITAL | Age: 83
Discharge: HOME | End: 2023-08-03
Attending: INTERNAL MEDICINE
Payer: MEDICARE

## 2023-08-03 VITALS
HEART RATE: 87 BPM | HEIGHT: 61 IN | SYSTOLIC BLOOD PRESSURE: 132 MMHG | DIASTOLIC BLOOD PRESSURE: 78 MMHG | OXYGEN SATURATION: 96 % | WEIGHT: 149.94 LBS | BODY MASS INDEX: 28.31 KG/M2

## 2023-08-03 DIAGNOSIS — Z12.31 ENCOUNTER FOR SCREENING MAMMOGRAM FOR BREAST CANCER: ICD-10-CM

## 2023-08-03 DIAGNOSIS — I10 PRIMARY HYPERTENSION: ICD-10-CM

## 2023-08-03 DIAGNOSIS — Z23 NEED FOR VACCINATION: Primary | ICD-10-CM

## 2023-08-03 DIAGNOSIS — I10 ESSENTIAL HYPERTENSION: ICD-10-CM

## 2023-08-03 DIAGNOSIS — R26.89 BALANCE DISORDER: ICD-10-CM

## 2023-08-03 DIAGNOSIS — M89.8X1 PAIN OF LEFT CLAVICLE: ICD-10-CM

## 2023-08-03 DIAGNOSIS — Z00.00 ANNUAL PHYSICAL EXAM: Primary | ICD-10-CM
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
## 2023-08-03 DIAGNOSIS — Z12.11 COLON CANCER SCREENING: ICD-10-CM

## 2023-08-03 PROCEDURE — 73000 X-RAY EXAM OF COLLAR BONE: CPT | Mod: TC,HCNC,LT

## 2023-08-03 PROCEDURE — 1126F PR PAIN SEVERITY QUANTIFIED, NO PAIN PRESENT: ICD-10-PCS | Mod: HCNC,CPTII,S$GLB, | Performed by: INTERNAL MEDICINE

## 2023-08-03 PROCEDURE — 3078F PR MOST RECENT DIASTOLIC BLOOD PRESSURE < 80 MM HG: ICD-10-PCS | Mod: HCNC,CPTII,S$GLB, | Performed by: INTERNAL MEDICINE

## 2023-08-03 PROCEDURE — 73000 XR CLAVICLE LEFT: ICD-10-PCS | Mod: 26,HCNC,LT, | Performed by: RADIOLOGY

## 2023-08-03 PROCEDURE — 91312 COVID-19, MRNA, LNP-S, BIVALENT BOOSTER, PF, 30 MCG/0.3 ML DOSE: CPT | Mod: HCNC,S$GLB,, | Performed by: INTERNAL MEDICINE

## 2023-08-03 PROCEDURE — 71130 XR STERNOCLAVICULAR JOINTS MIN 3 VIEW: ICD-10-PCS | Mod: 26,HCNC,, | Performed by: RADIOLOGY

## 2023-08-03 PROCEDURE — 71130 X-RAY STRENOCLAVIC JT 3/>VWS: CPT | Mod: 26,HCNC,, | Performed by: RADIOLOGY

## 2023-08-03 PROCEDURE — 1160F PR REVIEW ALL MEDS BY PRESCRIBER/CLIN PHARMACIST DOCUMENTED: ICD-10-PCS | Mod: HCNC,CPTII,S$GLB, | Performed by: INTERNAL MEDICINE

## 2023-08-03 PROCEDURE — 73000 X-RAY EXAM OF COLLAR BONE: CPT | Mod: 26,HCNC,LT, | Performed by: RADIOLOGY

## 2023-08-03 PROCEDURE — 99999 PR PBB SHADOW E&M-EST. PATIENT-LVL V: ICD-10-PCS | Mod: PBBFAC,HCNC,, | Performed by: INTERNAL MEDICINE

## 2023-08-03 PROCEDURE — 99397 PR PREVENTIVE VISIT,EST,65 & OVER: ICD-10-PCS | Mod: HCNC,S$GLB,, | Performed by: INTERNAL MEDICINE

## 2023-08-03 PROCEDURE — 71130 X-RAY STRENOCLAVIC JT 3/>VWS: CPT | Mod: TC,HCNC

## 2023-08-03 PROCEDURE — 1159F MED LIST DOCD IN RCRD: CPT | Mod: HCNC,CPTII,S$GLB, | Performed by: INTERNAL MEDICINE

## 2023-08-03 PROCEDURE — 3075F PR MOST RECENT SYSTOLIC BLOOD PRESS GE 130-139MM HG: ICD-10-PCS | Mod: HCNC,CPTII,S$GLB, | Performed by: INTERNAL MEDICINE

## 2023-08-03 PROCEDURE — 0124A COVID-19, MRNA, LNP-S, BIVALENT BOOSTER, PF, 30 MCG/0.3 ML DOSE: CPT | Mod: HCNC,S$GLB,, | Performed by: INTERNAL MEDICINE

## 2023-08-03 PROCEDURE — 99397 PER PM REEVAL EST PAT 65+ YR: CPT | Mod: HCNC,S$GLB,, | Performed by: INTERNAL MEDICINE

## 2023-08-03 PROCEDURE — 1100F PTFALLS ASSESS-DOCD GE2>/YR: CPT | Mod: HCNC,CPTII,S$GLB, | Performed by: INTERNAL MEDICINE

## 2023-08-03 PROCEDURE — 1160F RVW MEDS BY RX/DR IN RCRD: CPT | Mod: HCNC,CPTII,S$GLB, | Performed by: INTERNAL MEDICINE

## 2023-08-03 PROCEDURE — 1159F PR MEDICATION LIST DOCUMENTED IN MEDICAL RECORD: ICD-10-PCS | Mod: HCNC,CPTII,S$GLB, | Performed by: INTERNAL MEDICINE

## 2023-08-03 PROCEDURE — 1100F PR PT FALLS ASSESS DOC 2+ FALLS/FALL W/INJURY/YR: ICD-10-PCS | Mod: HCNC,CPTII,S$GLB, | Performed by: INTERNAL MEDICINE

## 2023-08-03 PROCEDURE — 91312 COVID-19, MRNA, LNP-S, BIVALENT BOOSTER, PF, 30 MCG/0.3 ML DOSE: ICD-10-PCS | Mod: HCNC,S$GLB,, | Performed by: INTERNAL MEDICINE

## 2023-08-03 PROCEDURE — 3288F FALL RISK ASSESSMENT DOCD: CPT | Mod: HCNC,CPTII,S$GLB, | Performed by: INTERNAL MEDICINE

## 2023-08-03 PROCEDURE — 3288F PR FALLS RISK ASSESSMENT DOCUMENTED: ICD-10-PCS | Mod: HCNC,CPTII,S$GLB, | Performed by: INTERNAL MEDICINE

## 2023-08-03 PROCEDURE — 3075F SYST BP GE 130 - 139MM HG: CPT | Mod: HCNC,CPTII,S$GLB, | Performed by: INTERNAL MEDICINE

## 2023-08-03 PROCEDURE — 99999 PR PBB SHADOW E&M-EST. PATIENT-LVL V: CPT | Mod: PBBFAC,HCNC,, | Performed by: INTERNAL MEDICINE

## 2023-08-03 PROCEDURE — 1126F AMNT PAIN NOTED NONE PRSNT: CPT | Mod: HCNC,CPTII,S$GLB, | Performed by: INTERNAL MEDICINE

## 2023-08-03 PROCEDURE — 3078F DIAST BP <80 MM HG: CPT | Mod: HCNC,CPTII,S$GLB, | Performed by: INTERNAL MEDICINE

## 2023-08-03 PROCEDURE — 0124A COVID-19, MRNA, LNP-S, BIVALENT BOOSTER, PF, 30 MCG/0.3 ML DOSE: ICD-10-PCS | Mod: HCNC,S$GLB,, | Performed by: INTERNAL MEDICINE

## 2023-08-03 RX ORDER — LISINOPRIL 10 MG/1
10 TABLET ORAL DAILY
Qty: 90 TABLET | Refills: 3 | Status: SHIPPED | OUTPATIENT
Start: 2023-08-03

## 2023-08-03 NOTE — PROGRESS NOTES
"Subjective:       Patient ID: Milady Blanco is a 82 y.o. female.    Chief Complaint: Annual Exam   Patient is Malaysian-speaking  present This is an 82-year-old who presents today for physical she has been doing fairly well has not been in for a few years but has been good about taking her blood pressure medicine and when she does check her pressure at home has been good she stopped her Zoloft previously as she was having some trouble with nightmares and does feel like she is an anxious person but is not feeling depressed she reports has been trying to eat a little healthier and her weight has come down.  She has had a few falls to in the last several months 1 she lost her footing and slipped at home and the other she fell out of bed she denies any knee pain neck her back pain she does have some issues with her left clavicular sternal area where she was having some prominence since she fell.  She would likely of the everything done since she had not been in for a while    HPI  Review of Systems   Musculoskeletal:         Left clavicle/sternoclavicular prominance pain since fall 1 month ago    Psychiatric/Behavioral:          Enies depression anxiety imprved       Objective:    Blood pressure 132/78, pulse 87, height 5' 1" (1.549 m), weight 68 kg (149 lb 14.6 oz), SpO2 96 %.   Physical Exam  Constitutional:       General: She is not in acute distress.  HENT:      Head: Normocephalic.      Mouth/Throat:      Pharynx: Oropharynx is clear.   Eyes:      General: No scleral icterus.  Neck:      Comments: Some prominance sternoclavicular left non tender  Cardiovascular:      Rate and Rhythm: Normal rate and regular rhythm.      Heart sounds: Normal heart sounds. No murmur heard.     No friction rub. No gallop.      Comments: Breast : normal no masses or tenderness      Pulmonary:      Effort: Pulmonary effort is normal. No respiratory distress.      Breath sounds: Normal breath sounds.   Abdominal:      " General: Bowel sounds are normal.      Palpations: Abdomen is soft. There is no mass.      Tenderness: There is no abdominal tenderness.   Musculoskeletal:      Cervical back: Neck supple.   Skin:     Findings: No erythema.   Neurological:      Mental Status: She is alert.   Psychiatric:         Mood and Affect: Mood normal.         Assessment:       1. Annual physical exam    2. Primary hypertension    3. Encounter for screening mammogram for breast cancer    4. Colon cancer screening    5. Pain of left clavicle    6. Fall, sequela    7. Balance disorder    8. Essential hypertension        Plan:       Milady was seen today for annual exam.    Diagnoses and all orders for this visit:    Annual physical exam  -     CBC Auto Differential; Future  -     Comprehensive Metabolic Panel; Future  -     Lipid Panel; Future  -     TSH; Future    Primary hypertension  -     CBC Auto Differential; Future  -     Comprehensive Metabolic Panel; Future  -     Lipid Panel; Future  -     TSH; Future    Encounter for screening mammogram for breast cancer  -     Mammo Digital Screening Bilat w/ Saeed; Future    Colon cancer screening  History of polyp she would like to proceed with  -     Ambulatory referral/consult to Endo Procedure ; Future    Pain of left clavicle  -     X-Ray Clavicle Left; Future  -     X-Ray Sternoclavicular Joints Min 3 View; Future    Fall, sequela  We discussed fall precautions and since sometimes she feels that her gait or balance may be off we discussed physical therapy program to assist prevent further falls  -     X-Ray Sternoclavicular Joints Min 3 View; Future  -     Ambulatory referral/consult to Physical/Occupational Therapy; Future    Balance disorder  -     Ambulatory referral/consult to Physical/Occupational Therapy; Future    Essential hypertension  Continue lisinopril refill provided  -     lisinopriL 10 MG tablet; Take 1 tablet (10 mg total) by mouth once daily.    Update mammogram bone  density labs    Follow-up annually sooner if concern    COVID booster discussed

## 2023-08-15 ENCOUNTER — TELEPHONE (OUTPATIENT)
Dept: INTERNAL MEDICINE | Facility: CLINIC | Age: 83
End: 2023-08-15
Payer: MEDICARE

## 2023-08-16 ENCOUNTER — TELEPHONE (OUTPATIENT)
Dept: INTERNAL MEDICINE | Facility: CLINIC | Age: 83
End: 2023-08-16
Payer: MEDICARE

## 2023-08-16 NOTE — TELEPHONE ENCOUNTER
Daughter Celia called back and discussed her moms xray results from recent fall. Verbalized understanding with read back.

## 2023-08-17 ENCOUNTER — CLINICAL SUPPORT (OUTPATIENT)
Dept: REHABILITATION | Facility: HOSPITAL | Age: 83
End: 2023-08-17
Payer: MEDICARE
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
## 2023-08-17 DIAGNOSIS — R26.89 BALANCE DISORDER: ICD-10-CM

## 2023-08-17 PROCEDURE — 97110 THERAPEUTIC EXERCISES: CPT

## 2023-08-17 PROCEDURE — 97161 PT EVAL LOW COMPLEX 20 MIN: CPT

## 2023-08-18 NOTE — PLAN OF CARE
[facility]   Physical Therapy Initial Evaluation      Name: Milady Blanco  Clinic Number: [id number]    Therapy Diagnosis:   Encounter Diagnoses   Name Primary?    Fall, sequela     Balance disorder         Physician: Lili Yi*    Physician Orders: PT Eval and Treat   Medical Diagnosis from Referral:     W19.XXXS (ICD-10-CM) - Fall, sequela   R26.89 (ICD-10-CM) - Balance disorder     Evaluation Date: 8/17/2023  Authorization Period Expiration: 08/02/2024  Plan of Care Expiration: 11/17/23  Progress Note Due: 11/17/23  Visit # / Visits authorized: 1/ 1   FOTO: 1/3    Precautions: Standard     Time In: 0900  Time Out: 0940  Total Appointment Time (timed & untimed codes): 40 minutes    Subjective     Date of onset: ~1 year    History of current condition - Milady reports:     Patient is here with  who is helping some with interpreting.    Patient reports she has had ~3-4 falls in the past year. Most recently she had a slip and fall in the home and falling out of bed another time. She reports no injury, loss of consciousness, or common reason for falls. She denies dizziness or lightheadedness prior to falls, but occasionally will get dizzy with quick changes of position or when she lays down too quickly. She is currently on Lisinopril for BP management. She reports not feeling any true difficulty with balance. She works in the house and yard daily chris gardening. She does not report desire for exercise routine or to start going to the gym.    Her  reports he doesn't know if medication or heat due to working in garden has recently contributed to falls.    PMH significant for osteoporosis.      PER MD NOTE 8/3/2023:    Chief Complaint: Annual Exam   Patient is [place]ayan-speaking  present This is an 82-year-old who presents today for physical she has been doing fairly well has not been in for a few years but has been good about taking her blood pressure  medicine and when she does check her pressure at home has been good she stopped her Zoloft previously as she was having some trouble with nightmares and does feel like she is an anxious person but is not feeling depressed she reports has been trying to eat a little healthier and her weight has come down.  She has had a few falls to in the last several months 1 she lost her footing and slipped at home and the other she fell out of bed she denies any knee pain neck her back pain she does have some issues with her left clavicular sternal area where she was having some prominence since she fell.  She would likely of the everything done since she had not been in for a while    Falls:  3-4x in past year; last month inside the house    Imaging:     X-ray:  FINDINGS:  The is visualized skeletal structures are intact with no fracture or dislocation identified.  She may have osteoporosis.  Minimal degenerative changes are present at the sternoclavicular joints.  Degenerative changes are partially shown in the AC joints also.     Visualized thorax shows aortic atherosclerosis and the small calcified granuloma in left lung.     Impression:     No acute abnormality.  DJD.    Prior Therapy: none  Social History: lives with their family  Occupation: N/A  Prior Level of Function: ADLs w/o complaint  Current Level of Function: ADLs w/o complaint - falls 3-4x in past year w/o known mechanism.    Pain:  Current 0/10, worst 0/10, best 0/10   Location: No pain reported  Description: No pain reported  Aggravating Factors: Fall risk due to bed height, heat, medication?  Easing Factors: no easing factors    Patients goals: Assess fall risk and reasons for falls.     Medical History:   Past Medical History:   Diagnosis Date    Asymptomatic cholelithiasis     Colon polyp     Depression     Eosinophilic granuloma of lung     Gastritis     GERD (gastroesophageal reflux disease)     hiatal hernia     History of epistaxis     Hypertension      Osteoporosis, senile     Pelvic relaxation     with pessary        Surgical History:   Milady Blanco  has a past surgical history that includes Carpal tunnel release; Eyelid surgery; Excision of neurofibroma; Hysterectomy; Colonoscopy (N/A, 10/20/2016); and Eye surgery.    Medications:   Milady has a current medication list which includes the following prescription(s): calcium carbonate, cholecalciferol (vitamin d3), lisinopril, milk thistle, TAC 0.1%/LOPROX/MOM, vitamin e, and [DISCONTINUED] triamcinolone acetonide 0.1%.    Allergies:   Review of patient's allergies indicates:   Allergen Reactions    Actonel [risedronate]      Gi symptoms         Objective      Lower Extremity Strength:  Right LE  Left LE    Quadriceps: 4+/5 Quadriceps: 4+/5   Hamstrings: 4+/5 Hamstrings: 4+/5   Hip Flexion: 3+/5 Hip Flexion: 3+/5   Hip Extension:  3+/5 Hip Extension: 3+/5   Ankle Dorsiflexion: 5/5 Ankle Dorsiflexion: 5/5   Ankle Plantarflexion: 5/5 Ankle Plantarflexion: 5/5     Special testing:   - 30 sec sit to stand: 16 reps (normative for 80+ and Female 9-14 reps)   - Tu.95 seconds (normative for decreased fall risk <14 seconds)    Limitation/Restriction for FOTO NOC-musculo-skeletal disorder Survey    Therapist reviewed FOTO scores for Milady Blanco on 2023.   FOTO documents entered into [employer] - see Media section.    Limitation Score: see media%         Treatment     Total Treatment time (time-based codes) separate from Evaluation: 10 minutes     Milady received the treatments listed below:      therapeutic exercises to develop strength, endurance, ROM, flexibility, posture, and core stabilization for 10 minutes including:    Patient education:  - fall risk assessment review of results  - impairment assessment  - reaching out to MD   - activity modification strategies e.g. gardening in morning when cooler; slowing down change of position; bed height changes  - importance of mitigating fall risk secondary  to increased to risk of fracture.    Patient Education and Home Exercises     Education provided:   - see above    Written Home Exercises Provided: none. Exercises were reviewed and Milady was able to demonstrate them prior to the end of the session.  Milady demonstrated good understanding of the education provided. See EMR under Patient Instructions for exercises provided during therapy sessions.    Assessment     Milady is a 82 y.o. female referred to outpatient Physical Therapy with a medical diagnosis of W19.XXXS (ICD-10-CM) - Fall, sequela and R26.89 (ICD-10-CM) - Balance disorder. Patient presents with mild and expected LE strength deficits based on age and demonstrates good scores on fall risk assessment tools of TUG and 30 sec sit to stand. Patient and  educated on results of assessment, activity modifications to reduce falls, and importance of preventing falls due to increased risk of fracture secondary to patient osteoporosis diagnosis.     May not require PT intervention at current time secondary to results of assessment, but plan to reach out to MD concerning possible other mechanisms contributing to falls.    Plan of care discussed with patient: Yes  Patient's spiritual, cultural and educational needs considered and patient is agreeable to the plan of care and goals as stated below:     Anticipated Barriers for therapy: age, BMI, unknown consistent reason for falls    Medical Necessity is demonstrated by the following  History  Co-morbidities and personal factors that may impact the plan of care [] LOW: no personal factors / co-morbidities  [x] MODERATE: 1-2 personal factors / co-morbidities  [] HIGH: 3+ personal factors / co-morbidities    Moderate / High Support Documentation:   Co-morbidities affecting plan of care: age, BMI, unknown true reason for falls    Personal Factors:   no deficits     Examination  Body Structures and Functions, activity limitations and participation restrictions  that may impact the plan of care [x] LOW: addressing 1-2 elements  [] MODERATE: 3+ elements  [] HIGH: 4+ elements (please support below)    Moderate / High Support Documentation:  mild and expected LE strength deficits based on age     Clinical Presentation [] LOW: stable  [x] MODERATE: Evolving  [] HIGH: Unstable     Decision Making/ Complexity Score: low       GOALS: Short Term Goals:  0-4 weeks  1. Increase strength by 1/3 MMT grade in global LE musculature  to increase tolerance for ADL and work activities.  2.Patient goal: Reduce risk of falls  3. Pt will report at Goal score on FOTO  to demonstrate increase in LE function with every day tasks.     Plan     Plan of care Certification: 8/17/2023 to 11/17/2023.    Hold on formal PT intervention; will reach out to MD. GINGER LION, PT, DPT, OCS

## 2023-08-29 ENCOUNTER — TELEPHONE (OUTPATIENT)
Dept: ENDOSCOPY | Facility: HOSPITAL | Age: 83
End: 2023-08-29
Payer: MEDICARE

## 2023-08-29 NOTE — TELEPHONE ENCOUNTER
Dr Blanc,    Patient has an order in from his PCP for a colonoscopy because of history of polyps. The patient was scoped by Dr. Bernard in 2016 and his recommendation was to repeat in 5 years. With the new guidelines for patients >79 y/o, do you still want to proceed with this procedure? Please advise.    Thank you,  Isadora Mon

## 2023-08-30 ENCOUNTER — TELEPHONE (OUTPATIENT)
Dept: ENDOSCOPY | Facility: HOSPITAL | Age: 83
End: 2023-08-30
Payer: MEDICARE

## 2023-09-08 ENCOUNTER — TELEPHONE (OUTPATIENT)
Dept: ENDOSCOPY | Facility: HOSPITAL | Age: 83
End: 2023-09-08
Payer: MEDICARE

## 2023-09-12 ENCOUNTER — TELEPHONE (OUTPATIENT)
Dept: ENDOSCOPY | Facility: HOSPITAL | Age: 83
End: 2023-09-12
Payer: MEDICARE

## 2023-09-12 VITALS — WEIGHT: 149 LBS | HEIGHT: 61 IN | BODY MASS INDEX: 28.13 KG/M2

## 2023-09-12 DIAGNOSIS — Z12.11 COLON CANCER SCREENING: Primary | ICD-10-CM

## 2023-09-12 RX ORDER — POLYETHYLENE GLYCOL 3350, SODIUM SULFATE ANHYDROUS, SODIUM BICARBONATE, SODIUM CHLORIDE, POTASSIUM CHLORIDE 236; 22.74; 6.74; 5.86; 2.97 G/4L; G/4L; G/4L; G/4L; G/4L
4 POWDER, FOR SOLUTION ORAL ONCE
Qty: 4000 ML | Refills: 0 | Status: SHIPPED | OUTPATIENT
Start: 2023-09-12 | End: 2023-09-12

## 2023-09-12 NOTE — TELEPHONE ENCOUNTER
Colonoscopy Procedure Prep Instructions    Date of procedure: 12/21/23Arrive at: 11:40 AM    Location of Department:   [facility] [address] [place] [address]  Take the [facility] Elevators to 4th Floor Endoscopy Lab    As soon as possible:   your prep from pharmacy and over the counter DULCOLAX LAXATIVE TABLETS            On the day before your procedure   What You CAN do:   You may have clear liquids ONLY-see below for list.     Liquids That Are OK to Drink:   Water  Sports drinks (Gatorade, Power-Aid)  Coffee or tea (no cream or nondairy creamer)  Clear juices without pulp (apple, white grape)  Gelatin desserts (no fruit or toppings)  Clear soda (sprite, coke, ginger ale)  Chicken broth (until 12 midnight the night before procedure)    What You CANNOT do:   Do not EAT solid food, drink milk or anything   colored red.  Do not drink alcohol.  Do not take oral medications within 1 hour of starting   each dose of prep.  No gum chewing or candy morning of procedure                       Note:   (Please disregard the insert instructions from pharmacy).  PEG Bowel Prep is indicated for cleansing of the colon as a preparation for colonoscopy in adults.   Be sure to tell your doctor about all the medicines you take, including prescription and non-prescription medicines, vitamins, and herbal supplements. PEG Bowel Prep may affect how other medicines work.  Medication taken by mouth may not be absorbed properly when taken within 1 hour before the start of each dose of PEG Bowel Prep.    It is not uncommon to experience some abdominal cramping, nausea and/or vomiting when taking the prep. If you have nausea and/or vomiting while taking the prep, stop drinking for 20 to 30 minutes then continue.      How to take prep:    PEG Bowel Prep is a (2-day) prep.    One (1) bottle of prep are required for a complete preparation for colonoscopy. Dilute the solution concentrate as directed prior to  use. You must drink water with each dose of prep, and additional water after each dose.    DOSE 1--Day Before Colonoscopy 12/20/23     Drink at least 6 to 8 glasses of clear liquids from time you wake up until you begin your prep and then continue until bedtime to avoid dehydration.     12:00 pm (NOON) Mix your entire container of prep with lukewarm water and refrigerate. Take four (4) Dulcolax (Bisacodyl) tablets with at least 8 ounces or more of clear liquids.       6:00 pm:    You must complete Steps 1 and 2 below before going to bed:    Step 1-Drink half the liquid in the container within one (1) hour.   Step 2-Refrigerate the remaining half of the liquid for dose 2. See below when to begin this step.                       IMPORTANT: If you experience preparation-related symptoms (for example, nausea, bloating, or cramping), stop, or slow the rate of drinking the additional water until your symptoms decrease.    DOSE 2--Day of the Colonoscopy 12/21/23 at 7-8 AM.    For this dose, repeat Step 1 shown above using the remaining half of the liquid prep.   You may continue drinking water/clear liquids until   4 hours before your colonoscopy or as directed by the scheduling nurse  8:30 AM.    For more information about your procedure, please watch this informational video. It is important to watch this animated consent video prior to your arrival.   If you haven't watched the video prior to arriving, you are required to watch it during admission which can cause delays.     Options for viewing:  Using a keyboard:  press and hold the control tab (Ctrl) and left mouse click to follow link          Colonoscopy Instructional Video                                                        OR    Type link address into your web browser's address bar:  [email or web address].com/[email or web address]    Using a mobile phone: tap on web address/link.     Comments:         IMPORTANT INFORMATION TO KNOW BEFORE YOUR PROCEDURE    [facility] 4th Floor    If your procedure requires the administration of anesthesia, it is necessary for a responsible adult to drive you home. (Medical Transportation, Uber, Lyft, Taxi, etc. may ONLY be used if a responsible adult is present to accompany you home.  The responsible adult CAN'T be the  of the service).      person must be available to return to pick you up within 30 minutes of being notified of discharge.     Due to the limited socially distant seating in our waiting room, please limit your guest (1) who accompany you for this procedure. If someone accompanies you for this procedure into the facility:    Consider having them proceed to an area that is socially distant other than the lobby until a member of the medical team contacts them to provide an update after the procedure.     Also, please consider being dropped off and picked up from the facility.      Please bring a picture ID, insurance card, & copayment    Take Medications as directed below:        If you begin taking any blood thinning medications, please contact the  listed below as soon as possible.    If you are diabetic see the attached instruction sheet regarding your medication.     If you take HEART, BLOOD PRESSURE, SEIZURE, PAIN, LUNG (including inhalers/nebulizers), ANTI-REJECTION (transplant patients), or PSYCHIATRIC medications, please take at your regular times with a sip of water or as directed by the scheduling nurse.     Important contact information:    Endoscopy [phone] Hours of operation Monday-Friday 8:00-4:30pm.    Questions about insurance or financial obligations call [phone] or [phone].    If you have questions regarding the prep or need to reschedule, please call [phone]. After hours questions requiring immediate assistance, contact [facility] On[facility]Call nurse line at [phone] or [phone].   NOTE:     On occasion, unforeseen  circumstances may cause a delay in your procedure start time. We respect your time and appreciate your patience during these circumstances.      Comments:       Are you ready for your Colonoscopy?      __ If you take blood thinners, have you stopped taking them according to your doctor's instructions before your procedures?  __ Have you stopped eating solid foods and followed a clear liquid diet a full day before your procedure or followed the diet       guidelines indicated in your instructions? REMINDER: NO BROTH AFTER MIDNIGHT THE DAY BEFORE PROCEDURE.   __ Have you completed all your prep solution? PLEASE DO NOT FOLLOW the insert/or box instructions from pharmacy)  __ Have you taken your blood pressure, heart, seizure, or other essential medications the morning of your procedure?  __ Have you planned for a ride to and from procedure?  (Medical Transportation, Uber, Lyft, Taxi, etc. may ONLY be used if a responsible adult is present to accompany you home). The responsible adult CANNOT be the  of the service.       Questions or Concerns?  Please call us!  [phone] (M-F) [phone] (Nights and weekends)    Listed below are some helpful tips:    Please bring protective cases for eyewear and hearing aids-wear comfortable clothing/shoes.  Follow prep instructions closely so you don't have to do it twice.  Bowel prep is prescription used to clean out the colon before a colonoscopy. The prep increases movement of your colon by causing you to have diarrhea (loose stools). Cleaning out stool from the colon helps your doctor to see in your colon clearly during this procedure.   It is important to stay hydrated before, during and after bowel prep to prevent loss of fluid (dehydration). You can have water and your choice of clear liquids. Reminder: these liquids you will drink in addition to the bowel prep.     Preparing the mixture:    First, mix the prep with water.  Make the taste better by adding a sugar free  drink mix (Crystal Light) can improve the taste of your prep.   Use a large bore (opening) straw. Place towards the back of mouth (throat) as tolerated.  Prepare a prep mixture that is lightly chilled, but not ice-cold. Drinking a large amount of ice-cold liquid can make you feel very ill.  Avoid drinking any RED beverages or eating popsicles with this color for the 24 hours leading up to your procedure. This color can look like blood in the colon.    Consuming the prep:    Take your time. If you feel ill, take a 15-minute break from drinking the prep mixture  Combat hunger and dehydration with clear liquids. Options like JELL-O, or popsicles will help.  Settle in with good reading material. The goal is to clean out 6 feet of colon, so you can plan on spending a good deal of time in the bathroom. Have some good reading material on-hand or an iPad ready to keep yourself entertained!  Keep yourself comfortable. We recommend applying personal hygiene wipes, Tucks pads and a soothing ointment, like A&D ointment, Desitin, or Vaseline to your bottom before starting and as needed to protect your skin.

## 2023-09-12 NOTE — TELEPHONE ENCOUNTER
Spoke to patient to schedule procedure(s) Colonoscopy       Physician to perform procedure(s) Dr. KECIA Duncan  Date of Procedure (s) 12/21/23  Arrival Time 11:40 AM  Time of Procedure(s) 12:40 PM   Location of Procedure(s) [facility] 4th Floor  Type of Rx Prep sent to patient: PEG  Instructions provided to patient via Postal Mail    Patient was informed on the following information and verbalized understanding. Screening questionnaire reviewed with patient and complete. If procedure requires anesthesia, a responsible adult needs to be present to accompany the patient home, patient cannot drive after receiving anesthesia. Appointment details are tentative, especially check-in time. Patient will receive a prep-op call 4 days prior to confirm check-in time for procedure. If applicable the patient should contact their pharmacy to verify Rx for procedure prep is ready for pick-up. Patient was advised to call the scheduling department at [phone] if pharmacy states no Rx is available. Patient was advised to call the endoscopy scheduling department if any questions or concerns arise.      [facility] Endoscopy Scheduling Department

## 2023-12-19 ENCOUNTER — TELEPHONE (OUTPATIENT)
Dept: INTERNAL MEDICINE | Facility: CLINIC | Age: 83
End: 2023-12-19
Payer: MEDICARE

## 2023-12-19 NOTE — TELEPHONE ENCOUNTER
----- Message from Carla Madden sent at 12/19/2023  1:26 PM CST -----  Contact: Patient with  [phone]  Patient would like to get medical advice.  Symptoms (please be specific):   cough/pt wants to make sure it is ok to go ahead and have the colonoscopy procedure on 12/21/2023  How long have you had these symptoms: 5 days  Would you like a call back, or a response through your [facility] portal?:   call back with   Pharmacy name and phone # (copy from chart):     [id number] - [place] - [address]  [address]  [place] [address]  Phone: [phone] Fax: [phone]   Comments:

## 2023-12-19 NOTE — TELEPHONE ENCOUNTER
Called  for pt Raciel ID number [id number] Pt states she has cough for 5 Days pt needed advice if she should reschedule colonoscopy. Pt states no more cough negative covid and feels fine. Pt was advised to proceed with procedure.

## 2023-12-21 ENCOUNTER — ANESTHESIA EVENT (OUTPATIENT)
Dept: ENDOSCOPY | Facility: HOSPITAL | Age: 83
End: 2023-12-21
Payer: MEDICARE

## 2023-12-21 ENCOUNTER — ANESTHESIA (OUTPATIENT)
Dept: ENDOSCOPY | Facility: HOSPITAL | Age: 83
End: 2023-12-21
Payer: MEDICARE

## 2023-12-21 ENCOUNTER — HOSPITAL ENCOUNTER (OUTPATIENT)
Facility: HOSPITAL | Age: 83
Discharge: HOME | End: 2023-12-21
Attending: COLON & RECTAL SURGERY | Admitting: COLON & RECTAL SURGERY
Payer: MEDICARE

## 2023-12-21 VITALS
DIASTOLIC BLOOD PRESSURE: 65 MMHG | OXYGEN SATURATION: 98 % | SYSTOLIC BLOOD PRESSURE: 142 MMHG | WEIGHT: 149 LBS | TEMPERATURE: 98 F | BODY MASS INDEX: 28.13 KG/M2 | RESPIRATION RATE: 20 BRPM | HEIGHT: 61 IN | HEART RATE: 77 BPM

## 2023-12-21 DIAGNOSIS — Z86.010 HISTORY OF COLON POLYPS: Primary | ICD-10-CM

## 2023-12-21 PROCEDURE — 45380 COLONOSCOPY AND BIOPSY: CPT | Mod: PT,HCNC,, | Performed by: COLON & RECTAL SURGERY

## 2023-12-21 PROCEDURE — 45380 COLONOSCOPY AND BIOPSY: CPT | Mod: PT,HCNC | Performed by: COLON & RECTAL SURGERY

## 2023-12-21 PROCEDURE — 37000009 HC ANESTHESIA EA ADD 15 MINS: Mod: HCNC | Performed by: COLON & RECTAL SURGERY

## 2023-12-21 PROCEDURE — 25000003 PHARM REV CODE 250: Mod: HCNC | Performed by: COLON & RECTAL SURGERY

## 2023-12-21 PROCEDURE — 37000008 HC ANESTHESIA 1ST 15 MINUTES: Mod: HCNC | Performed by: COLON & RECTAL SURGERY

## 2023-12-21 PROCEDURE — 88305 TISSUE EXAM BY PATHOLOGIST: CPT | Mod: HCNC | Performed by: PATHOLOGY

## 2023-12-21 PROCEDURE — 88305 TISSUE EXAM BY PATHOLOGIST: ICD-10-PCS | Mod: 26,HCNC,, | Performed by: PATHOLOGY

## 2023-12-21 PROCEDURE — 88305 TISSUE EXAM BY PATHOLOGIST: CPT | Mod: 26,HCNC,, | Performed by: PATHOLOGY

## 2023-12-21 PROCEDURE — 45380 PR COLONOSCOPY,BIOPSY: ICD-10-PCS | Mod: PT,HCNC,, | Performed by: COLON & RECTAL SURGERY

## 2023-12-21 PROCEDURE — 25000003 PHARM REV CODE 250: Mod: HCNC | Performed by: NURSE ANESTHETIST, CERTIFIED REGISTERED

## 2023-12-21 PROCEDURE — E9220 PRA ENDO ANESTHESIA: HCPCS | Mod: PT,HCNC,, | Performed by: NURSE ANESTHETIST, CERTIFIED REGISTERED

## 2023-12-21 PROCEDURE — E9220 PRA ENDO ANESTHESIA: ICD-10-PCS | Mod: PT,HCNC,, | Performed by: NURSE ANESTHETIST, CERTIFIED REGISTERED

## 2023-12-21 PROCEDURE — 63600175 PHARM REV CODE 636 W HCPCS: Mod: HCNC | Performed by: NURSE ANESTHETIST, CERTIFIED REGISTERED

## 2023-12-21 RX ORDER — ONDANSETRON 2 MG/ML
INJECTION INTRAMUSCULAR; INTRAVENOUS
Status: DISCONTINUED | OUTPATIENT
Start: 2023-12-21 | End: 2023-12-21

## 2023-12-21 RX ORDER — LIDOCAINE HYDROCHLORIDE 20 MG/ML
INJECTION INTRAVENOUS
Status: DISCONTINUED | OUTPATIENT
Start: 2023-12-21 | End: 2023-12-21

## 2023-12-21 RX ORDER — SODIUM CHLORIDE 9 MG/ML
INJECTION, SOLUTION INTRAVENOUS CONTINUOUS
Status: DISCONTINUED | OUTPATIENT
Start: 2023-12-21 | End: 2023-12-21 | Stop reason: HOSPADM

## 2023-12-21 RX ORDER — PROPOFOL 10 MG/ML
VIAL (ML) INTRAVENOUS
Status: DISCONTINUED | OUTPATIENT
Start: 2023-12-21 | End: 2023-12-21

## 2023-12-21 RX ORDER — PROPOFOL 10 MG/ML
VIAL (ML) INTRAVENOUS CONTINUOUS PRN
Status: DISCONTINUED | OUTPATIENT
Start: 2023-12-21 | End: 2023-12-21

## 2023-12-21 RX ADMIN — ONDANSETRON 4 MG: 2 INJECTION INTRAMUSCULAR; INTRAVENOUS at 01:12

## 2023-12-21 RX ADMIN — PROPOFOL 60 MG: 10 INJECTION, EMULSION INTRAVENOUS at 01:12

## 2023-12-21 RX ADMIN — SODIUM CHLORIDE: 0.9 INJECTION, SOLUTION INTRAVENOUS at 01:12

## 2023-12-21 RX ADMIN — PROPOFOL 100 MCG/KG/MIN: 10 INJECTION, EMULSION INTRAVENOUS at 01:12

## 2023-12-21 RX ADMIN — LIDOCAINE HYDROCHLORIDE 100 MG: 20 INJECTION INTRAVENOUS at 01:12

## 2023-12-21 NOTE — H&P
Procedure : Colonoscopy    Indication(s):  personal history of colon polyps    Last colonoscopy: 2016 - normal x/c diverticulosis    Review of patient's allergies indicates:   Allergen Reactions    Actonel [risedronate]      Gi symptoms        Past Medical History:   Diagnosis Date    Asymptomatic cholelithiasis     Colon polyp     Depression     Eosinophilic granuloma of lung     Gastritis     GERD (gastroesophageal reflux disease)     hiatal hernia     History of epistaxis     Hypertension     Osteoporosis, senile     Pelvic relaxation     with pessary        Prior to Admission medications    Medication Sig Start Date End Date Taking? Authorizing Provider   lisinopriL 10 MG tablet Take 1 tablet (10 mg total) by mouth once daily. 8/3/23  Yes Lili Yi MD   calcium carbonate (OS-DAVID) 600 mg (1,500 mg) Tab Take 600 mg by mouth 2 (two) times daily with meals.    Provider, Historical   cholecalciferol, vitamin D3, 1,000 unit capsule Take 1 capsule (1,000 Units total) by mouth once daily. 6/2/16   Lili Yi MD   milk thistle 175 mg tablet Take 175 mg by mouth once daily.    Provider, Historical   TAC 0.1%/LOPROX/MOM apply to affected area twice daily after cool blow dry 11/5/20   Chayito Monte MD   vitamin E 100 UNIT capsule Take 100 Units by mouth once daily.    Provider, Historical   triamcinolone acetonide 0.1% (KENALOG) 0.1 % cream AAA bid after cool blow dry 6/18/20 11/5/20  Chayito Monte MD       Sedation Problems: NO    Family History   Problem Relation Age of Onset    Coronary artery disease Mother     Heart disease Mother     No Known Problems Father     Coronary artery disease Maternal Grandmother     Heart disease Maternal Grandmother     Hypertension Paternal Uncle     Breast cancer Maternal Aunt     Cancer Maternal Aunt         breast cancer     Pancreatic cancer Cousin     Cancer Cousin         pancreatic cancer    Breast cancer Cousin     Heart disease  Maternal Uncle     Breast cancer Cousin     No Known Problems Sister     No Known Problems Brother     No Known Problems Paternal Aunt     No Known Problems Maternal Grandfather     No Known Problems Paternal Grandmother     No Known Problems Paternal Grandfather     No Known Problems Brother     Multiple sclerosis Daughter     Colon cancer Neg Hx     Ovarian cancer Neg Hx     Amblyopia Neg Hx     Blindness Neg Hx     Cataracts Neg Hx     Diabetes Neg Hx     Glaucoma Neg Hx     Macular degeneration Neg Hx     Retinal detachment Neg Hx     Strabismus Neg Hx     Stroke Neg Hx     Thyroid disease Neg Hx        Fam Hx of Sedation Problems: NO    Social History     Socioeconomic History    Marital status:    Tobacco Use    Smoking status: Never    Smokeless tobacco: Never   Substance and Sexual Activity    Alcohol use: No    Drug use: No    Sexual activity: Not Currently     Birth control/protection: None       Review of Systems -     Respiratory ROS: no cough, shortness of breath, or wheezing  Cardiovascular ROS: no chest pain or dyspnea on exertion  Gastrointestinal ROS: no abdominal pain, change in bowel habits, or black or bloody stools  Musculoskeletal ROS: negative  Neurological ROS: no TIA or stroke symptoms        Physical Exam:  General: no distress  Head: normocephalic  Airway:  normal oropharynx, airway normal  Neck: supple, symmetrical, trachea midline  Lungs:  normal respiratory effort  Heart: regular rate and rhythm  Abdomen: soft, non-tender non-distented; bowel sounds normal; no masses,  no organomegaly  Extremities: no cyanosis or edema, or clubbing       Deep Sedation: Mallampati Score per anesthesia     SedationPlan :Moderate     ASA : II    Patient is medically cleared for anesthesia.    Anesthesia/Surgery risks, benefits and alternative options discussed and understood by patient/family.

## 2023-12-21 NOTE — ANESTHESIA PREPROCEDURE EVALUATION
12/21/2023  Milady Blanco is a 83 y.o., female.      Pre-op Assessment    I have reviewed the Patient Summary Reports.       I have reviewed the Medications.     Review of Systems  Anesthesia Hx:  No problems with previous Anesthesia   Neg history of prior surgery.          Denies Family Hx of Anesthesia complications.    Denies Personal Hx of Anesthesia complications.                    Hematology/Oncology:  Hematology Normal   Oncology Normal                                   EENT/Dental:  EENT/Dental Normal           Cardiovascular:  Exercise tolerance: good   Hypertension       Denies Angina.          Functional Capacity good / => 4 METS            Carotoid Artery Disease               Pulmonary:  Pulmonary Normal                       Renal/:  Renal/ Normal                 Hepatic/GI:     GERD, well controlled             Neurological:    Denies Pain                                 Endocrine:  Endocrine Normal            Psych:  Psychiatric Normal          Phobia and Claustrophobia.        Physical Exam  General: Well nourished and Cooperative    Airway:  Mallampati: III / II  Mouth Opening: Normal  TM Distance: Normal  Tongue: Normal  Neck ROM: Normal ROM    Dental:  Intact, Partial Dentures    Chest/Lungs:  Clear to auscultation, Normal Respiratory Rate    Heart:  Rate: Normal    Anesthesia Plan  Type of Anesthesia, risks & benefits discussed:    Anesthesia Type: Gen Natural Airway  Intra-op Monitoring Plan: Standard ASA Monitors  Induction:  IV  Informed Consent: Informed consent signed with the Patient and all parties understand the risks and agree with anesthesia plan.  All questions answered.   ASA Score: 2  Day of Surgery Review of History & Physical: H&P Update referred to the surgeon/provider.    Ready For Surgery From Anesthesia Perspective.   .

## 2023-12-21 NOTE — ANESTHESIA POSTPROCEDURE EVALUATION
Anesthesia Post Evaluation    Patient: Milady Blanco    Procedure(s) Performed: Procedure(s) (LRB):  COLONOSCOPY (N/A)    Final Anesthesia Type: general      Patient location during evaluation: GI PACU  Patient participation: Yes- Able to Participate  Level of consciousness: awake and alert  Post-procedure vital signs: reviewed and stable  Pain management: adequate  Airway patency: patent    PONV status at discharge: No PONV  Anesthetic complications: no      Cardiovascular status: blood pressure returned to baseline  Respiratory status: unassisted  Hydration status: euvolemic  Follow-up not needed.              Vitals Value Taken Time   /65 12/21/23 1435   Temp 36.6 °C (97.9 °F) 12/21/23 1405   Pulse 77 12/21/23 1435   Resp 20 12/21/23 1435   SpO2 98 % 12/21/23 1435         Event Time   Out of Recovery 14:46:21         Pain/Matthew Score: Matthew Score: 10 (12/21/2023  2:20 PM)

## 2023-12-21 NOTE — TRANSFER OF CARE
"Anesthesia Transfer of Care Note    Patient: Milady Blanco    Procedure(s) Performed: Procedure(s) (LRB):  COLONOSCOPY (N/A)    Patient location: PACU    Anesthesia Type: general    Transport from OR: Transported from OR on room air with adequate spontaneous ventilation    Post pain: adequate analgesia    Post assessment: no apparent anesthetic complications    Post vital signs: stable    Level of consciousness: awake    Nausea/Vomiting: no nausea/vomiting    Complications: none    Transfer of care protocol was followed    Last vitals: Visit Vitals  BP (!) 140/83 (BP Location: Left arm, Patient Position: Lying)   Pulse 94   Temp 36.7 °C (98.1 °F) (Oral)   Resp 16   Ht 5' 1" (1.549 m)   Wt 67.6 kg (149 lb)   SpO2 97%   Breastfeeding No   BMI 28.15 kg/m²     "

## 2023-12-21 NOTE — PROVATION PATIENT INSTRUCTIONS
Discharge Summary/Instructions after an Endoscopic Procedure  Patient Name: Milady Blanco  Patient MRN: [id number]  Patient YOB: 1940 Thursday, December 21, 2023  Jero Duncan MD  Dear patient,  As a result of recent federal legislation (The Federal Cures Act), you may   receive lab or pathology results from your procedure in your [facility]   account before your physician is able to contact you. Your physician or   their representative will relay the results to you with their   recommendations at their soonest availability.  Thank you,  RESTRICTIONS:  During your procedure today, you received medications for sedation.  These   medications may affect your judgment, balance and coordination.  Therefore,   for 24 hours, you have the following restrictions:   - DO NOT drive a car, operate machinery, make legal/financial decisions,   sign important papers or drink alcohol.    ACTIVITY:  Today: no heavy lifting, straining or running due to procedural   sedation/anesthesia.  The following day: return to full activity including work.  DIET:  Eat and drink normally unless instructed otherwise.     TREATMENT FOR COMMON SIDE EFFECTS:  - Mild abdominal pain, nausea, belching, bloating or excessive gas:  rest,   eat lightly and use a heating pad.  - Sore Throat: treat with throat lozenges and/or gargle with warm salt   water.  - Because air was used during the procedure, expelling large amounts of air   from your rectum or belching is normal.  - If a bowel prep was taken, you may not have a bowel movement for 1-3 days.    This is normal.  SYMPTOMS TO WATCH FOR AND REPORT TO YOUR PHYSICIAN:  1. Abdominal pain or bloating, other than gas cramps.  2. Chest pain.  3. Back pain.  4. Signs of infection such as: chills or fever occurring within 24 hours   after the procedure.  5. Rectal bleeding, which would show as bright red, maroon, or black stools.   (A tablespoon of blood from the rectum is not serious, especially  if   hemorrhoids are present.)  6. Vomiting.  7. Weakness or dizziness.  GO DIRECTLY TO THE NEAREST EMERGENCY ROOM IF YOU HAVE ANY OF THE FOLLOWING:      Difficulty breathing              Chills and/or fever over 101 F   Persistent vomiting and/or vomiting blood   Severe abdominal pain   Severe chest pain   Black, tarry stools   Bleeding- more than one tablespoon   Any other symptom or condition that you feel may need urgent attention  Your doctor recommends these additional instructions:  If any biopsies were taken, your doctors clinic will contact you in 1 to 2   weeks with any results.  - Discharge patient to home.   - High fiber diet.   - Continue present medications.   - Await pathology results.   - Repeat colonoscopy date to be determined after pending pathology results   are reviewed for surveillance based on pathology results.   - Patient has a contact number available for emergencies.  The signs and   symptoms of potential delayed complications were discussed with the   patient.  Return to normal activities tomorrow.  Written discharge   instructions were provided to the patient.  For questions, problems or results please call your physician - Jero Duncan MD at Work:  [phone].  [facility] [place], EMERGENCY ROOM PHONE NUMBER: [phone]  IF A COMPLICATION OR EMERGENCY SITUATION ARISES AND YOU ARE UNABLE TO REACH   YOUR PHYSICIAN - GO DIRECTLY TO THE EMERGENCY ROOM.  Jero Duncan MD  12/21/2023 2:06:15 PM  This report has been verified and signed electronically.  Dear patient,  As a result of recent federal legislation (The Federal Cures Act), you may   receive lab or pathology results from your procedure in your [facility]   account before your physician is able to contact you. Your physician or   their representative will relay the results to you with their   recommendations at their soonest availability.  Thank you,  PROVATION

## 2023-12-26 LAB
FINAL PATHOLOGIC DIAGNOSIS: NORMAL
GROSS: NORMAL
Lab: NORMAL

## 2024-06-12 NOTE — TELEPHONE ENCOUNTER
Attempted to get in contact with pt in regards to rescheduling appt. Left a voice message to give office a call back.   [Left] : left knee [NL (140)] : flexion 140 degrees [NL (0)] : extension 0 degrees [Negative] : negative Karl's [] : no pain with varus stress

## 2024-08-07 DIAGNOSIS — Z78.0 MENOPAUSE: ICD-10-CM

## 2025-02-22 DIAGNOSIS — Z00.00 ENCOUNTER FOR MEDICARE ANNUAL WELLNESS EXAM: ICD-10-CM
